# Patient Record
Sex: FEMALE | Race: WHITE | Employment: FULL TIME | URBAN - METROPOLITAN AREA
[De-identification: names, ages, dates, MRNs, and addresses within clinical notes are randomized per-mention and may not be internally consistent; named-entity substitution may affect disease eponyms.]

---

## 2017-02-02 PROCEDURE — 81025 URINE PREGNANCY TEST: CPT | Performed by: INTERNAL MEDICINE

## 2017-04-17 ENCOUNTER — HOSPITAL ENCOUNTER (INPATIENT)
Facility: HOSPITAL | Age: 27
LOS: 3 days | Discharge: HOME | DRG: 417 | End: 2017-04-20
Attending: EMERGENCY MEDICINE | Admitting: INTERNAL MEDICINE
Payer: COMMERCIAL

## 2017-04-17 ENCOUNTER — APPOINTMENT (OUTPATIENT)
Dept: MRI IMAGING | Facility: HOSPITAL | Age: 27
DRG: 417 | End: 2017-04-17
Attending: EMERGENCY MEDICINE
Payer: COMMERCIAL

## 2017-04-17 ENCOUNTER — HOSPITAL ENCOUNTER (OUTPATIENT)
Age: 27
Discharge: LEFT BEFORE DISCHARGE | End: 2017-04-17
Attending: FAMILY MEDICINE
Payer: COMMERCIAL

## 2017-04-17 ENCOUNTER — APPOINTMENT (OUTPATIENT)
Dept: ULTRASOUND IMAGING | Facility: HOSPITAL | Age: 27
DRG: 417 | End: 2017-04-17
Attending: EMERGENCY MEDICINE
Payer: COMMERCIAL

## 2017-04-17 VITALS
SYSTOLIC BLOOD PRESSURE: 123 MMHG | HEART RATE: 71 BPM | OXYGEN SATURATION: 98 % | TEMPERATURE: 99 F | WEIGHT: 150 LBS | RESPIRATION RATE: 18 BRPM | DIASTOLIC BLOOD PRESSURE: 79 MMHG | BODY MASS INDEX: 26 KG/M2

## 2017-04-17 DIAGNOSIS — R10.13 ACUTE EPIGASTRIC PAIN: Primary | ICD-10-CM

## 2017-04-17 DIAGNOSIS — R74.8 ELEVATED LIVER ENZYMES: ICD-10-CM

## 2017-04-17 DIAGNOSIS — K80.20 CALCULUS OF GALLBLADDER WITHOUT CHOLECYSTITIS WITHOUT OBSTRUCTION: ICD-10-CM

## 2017-04-17 DIAGNOSIS — K80.50 CHOLEDOCHOLITHIASIS: Primary | ICD-10-CM

## 2017-04-17 PROCEDURE — 85025 COMPLETE CBC W/AUTO DIFF WBC: CPT | Performed by: EMERGENCY MEDICINE

## 2017-04-17 PROCEDURE — 93005 ELECTROCARDIOGRAM TRACING: CPT

## 2017-04-17 PROCEDURE — 81025 URINE PREGNANCY TEST: CPT

## 2017-04-17 PROCEDURE — 99205 OFFICE O/P NEW HI 60 MIN: CPT

## 2017-04-17 PROCEDURE — 99285 EMERGENCY DEPT VISIT HI MDM: CPT

## 2017-04-17 PROCEDURE — 96376 TX/PRO/DX INJ SAME DRUG ADON: CPT

## 2017-04-17 PROCEDURE — 99215 OFFICE O/P EST HI 40 MIN: CPT

## 2017-04-17 PROCEDURE — 96375 TX/PRO/DX INJ NEW DRUG ADDON: CPT

## 2017-04-17 PROCEDURE — 76700 US EXAM ABDOM COMPLETE: CPT | Performed by: RADIOLOGY

## 2017-04-17 PROCEDURE — 96361 HYDRATE IV INFUSION ADD-ON: CPT

## 2017-04-17 PROCEDURE — 93010 ELECTROCARDIOGRAM REPORT: CPT | Performed by: INTERNAL MEDICINE

## 2017-04-17 PROCEDURE — 80053 COMPREHEN METABOLIC PANEL: CPT | Performed by: EMERGENCY MEDICINE

## 2017-04-17 PROCEDURE — 96374 THER/PROPH/DIAG INJ IV PUSH: CPT

## 2017-04-17 PROCEDURE — 93010 ELECTROCARDIOGRAM REPORT: CPT

## 2017-04-17 PROCEDURE — 81001 URINALYSIS AUTO W/SCOPE: CPT | Performed by: EMERGENCY MEDICINE

## 2017-04-17 PROCEDURE — 74181 MRI ABDOMEN W/O CONTRAST: CPT

## 2017-04-17 PROCEDURE — 83690 ASSAY OF LIPASE: CPT | Performed by: EMERGENCY MEDICINE

## 2017-04-17 RX ORDER — MORPHINE SULFATE 2 MG/ML
2 INJECTION, SOLUTION INTRAMUSCULAR; INTRAVENOUS ONCE
Status: COMPLETED | OUTPATIENT
Start: 2017-04-17 | End: 2017-04-17

## 2017-04-17 RX ORDER — MORPHINE SULFATE 2 MG/ML
2 INJECTION, SOLUTION INTRAMUSCULAR; INTRAVENOUS EVERY 2 HOUR PRN
Status: DISCONTINUED | OUTPATIENT
Start: 2017-04-17 | End: 2017-04-20

## 2017-04-17 RX ORDER — ONDANSETRON 2 MG/ML
4 INJECTION INTRAMUSCULAR; INTRAVENOUS EVERY 4 HOURS PRN
Status: DISCONTINUED | OUTPATIENT
Start: 2017-04-17 | End: 2017-04-17

## 2017-04-17 RX ORDER — HYDROCODONE BITARTRATE AND ACETAMINOPHEN 5; 325 MG/1; MG/1
1 TABLET ORAL EVERY 4 HOURS PRN
Status: DISCONTINUED | OUTPATIENT
Start: 2017-04-17 | End: 2017-04-20

## 2017-04-17 RX ORDER — HEPARIN SODIUM 5000 [USP'U]/ML
5000 INJECTION, SOLUTION INTRAVENOUS; SUBCUTANEOUS EVERY 8 HOURS
Status: DISCONTINUED | OUTPATIENT
Start: 2017-04-17 | End: 2017-04-18 | Stop reason: SDUPTHER

## 2017-04-17 RX ORDER — ALBUTEROL SULFATE 2.5 MG/3ML
2.5 SOLUTION RESPIRATORY (INHALATION) EVERY 6 HOURS PRN
Status: DISCONTINUED | OUTPATIENT
Start: 2017-04-17 | End: 2017-04-18

## 2017-04-17 RX ORDER — HYDROMORPHONE HYDROCHLORIDE 1 MG/ML
0.5 INJECTION, SOLUTION INTRAMUSCULAR; INTRAVENOUS; SUBCUTANEOUS EVERY 30 MIN PRN
Status: ACTIVE | OUTPATIENT
Start: 2017-04-17 | End: 2017-04-18

## 2017-04-17 RX ORDER — MORPHINE SULFATE 2 MG/ML
1 INJECTION, SOLUTION INTRAMUSCULAR; INTRAVENOUS EVERY 2 HOUR PRN
Status: DISCONTINUED | OUTPATIENT
Start: 2017-04-17 | End: 2017-04-20

## 2017-04-17 RX ORDER — SODIUM CHLORIDE 9 MG/ML
1000 INJECTION, SOLUTION INTRAVENOUS ONCE
Status: COMPLETED | OUTPATIENT
Start: 2017-04-17 | End: 2017-04-17

## 2017-04-17 RX ORDER — SODIUM CHLORIDE 9 MG/ML
INJECTION, SOLUTION INTRAVENOUS CONTINUOUS
Status: DISCONTINUED | OUTPATIENT
Start: 2017-04-17 | End: 2017-04-18

## 2017-04-17 RX ORDER — SODIUM CHLORIDE 9 MG/ML
INJECTION, SOLUTION INTRAVENOUS CONTINUOUS
Status: DISCONTINUED | OUTPATIENT
Start: 2017-04-17 | End: 2017-04-20

## 2017-04-17 RX ORDER — ONDANSETRON 2 MG/ML
4 INJECTION INTRAMUSCULAR; INTRAVENOUS ONCE
Status: COMPLETED | OUTPATIENT
Start: 2017-04-17 | End: 2017-04-17

## 2017-04-17 RX ORDER — ONDANSETRON 2 MG/ML
4 INJECTION INTRAMUSCULAR; INTRAVENOUS EVERY 6 HOURS PRN
Status: DISCONTINUED | OUTPATIENT
Start: 2017-04-17 | End: 2017-04-20

## 2017-04-17 RX ORDER — HYDROCODONE BITARTRATE AND ACETAMINOPHEN 5; 325 MG/1; MG/1
2 TABLET ORAL EVERY 4 HOURS PRN
Status: DISCONTINUED | OUTPATIENT
Start: 2017-04-17 | End: 2017-04-20

## 2017-04-17 RX ORDER — ALBUTEROL SULFATE 90 UG/1
2 AEROSOL, METERED RESPIRATORY (INHALATION) EVERY 6 HOURS PRN
Status: DISCONTINUED | OUTPATIENT
Start: 2017-04-17 | End: 2017-04-20

## 2017-04-17 RX ORDER — MORPHINE SULFATE 4 MG/ML
4 INJECTION, SOLUTION INTRAMUSCULAR; INTRAVENOUS EVERY 2 HOUR PRN
Status: DISCONTINUED | OUTPATIENT
Start: 2017-04-17 | End: 2017-04-20

## 2017-04-17 RX ORDER — ACETAMINOPHEN 325 MG/1
650 TABLET ORAL EVERY 4 HOURS PRN
Status: DISCONTINUED | OUTPATIENT
Start: 2017-04-17 | End: 2017-04-20

## 2017-04-17 NOTE — ED INITIAL ASSESSMENT (HPI)
Epigastric pain,describes as constricting feeling under breast that wraps around,  started last noc after eating greek food. Denies n/v/d. Normal BM this AM, no relief w advil PTA.

## 2017-04-17 NOTE — ED PROVIDER NOTES
Patient Seen in: [facility]    History   Patient presents with:  Abdomen/Flank Pain (GI/): epigastric    Stated Complaint: just below sternum pain x 1 day    HPI    Pinedanora Dc is a 32year old female presents with 250 mg by mouth. Cyanocobalamin (VITAMIN B 12 OR),  Take by mouth. albuterol sulfate (2.5 MG/3ML) 0.083% Inhalation Nebu Soln,  Take 3 mL (2.5 mg total) by nebulization every 6 (six) hours as needed for Wheezing or Shortness of Breath.    Mometasone Fur severe acute abdominal pain in the epigastric area. Multiple differential diagnosis considered include acute pancreatitis, acute cholecystitis, bowel obstruction. Offered EMS transfer, patient refused and would like to go by private auto.   Sent to ER for

## 2017-04-17 NOTE — ED NOTES
Pt to ED with c/o generalized abd pain that started at 1900 last night. Pt has increased pain with palpation to all quadrants. Denies N/V/D. Afebrile. Denies dysuria. Pt pale on arrival. Friend at bedside. Updated on poc.

## 2017-04-17 NOTE — ED INITIAL ASSESSMENT (HPI)
Pt c/o epigastric pain since last night, denies nausea or vomiting.  Possible gallstones in the past

## 2017-04-18 ENCOUNTER — ANESTHESIA (OUTPATIENT)
Dept: ENDOSCOPY | Facility: HOSPITAL | Age: 27
DRG: 417 | End: 2017-04-18
Payer: COMMERCIAL

## 2017-04-18 ENCOUNTER — SURGERY (OUTPATIENT)
Age: 27
End: 2017-04-18

## 2017-04-18 ENCOUNTER — ANESTHESIA EVENT (OUTPATIENT)
Dept: ENDOSCOPY | Facility: HOSPITAL | Age: 27
DRG: 417 | End: 2017-04-18
Payer: COMMERCIAL

## 2017-04-18 ENCOUNTER — APPOINTMENT (OUTPATIENT)
Dept: GENERAL RADIOLOGY | Facility: HOSPITAL | Age: 27
DRG: 417 | End: 2017-04-18
Attending: INTERNAL MEDICINE
Payer: COMMERCIAL

## 2017-04-18 PROCEDURE — 94664 DEMO&/EVAL PT USE INHALER: CPT

## 2017-04-18 PROCEDURE — 94640 AIRWAY INHALATION TREATMENT: CPT

## 2017-04-18 PROCEDURE — BF110ZZ FLUOROSCOPY OF BILIARY AND PANCREATIC DUCTS USING HIGH OSMOLAR CONTRAST: ICD-10-PCS | Performed by: INTERNAL MEDICINE

## 2017-04-18 PROCEDURE — 85025 COMPLETE CBC W/AUTO DIFF WBC: CPT | Performed by: INTERNAL MEDICINE

## 2017-04-18 PROCEDURE — 80053 COMPREHEN METABOLIC PANEL: CPT | Performed by: INTERNAL MEDICINE

## 2017-04-18 PROCEDURE — 0F7D8DZ DILATION OF PANCREATIC DUCT WITH INTRALUMINAL DEVICE, VIA NATURAL OR ARTIFICIAL OPENING ENDOSCOPIC: ICD-10-PCS | Performed by: INTERNAL MEDICINE

## 2017-04-18 PROCEDURE — 74330 X-RAY BILE/PANC ENDOSCOPY: CPT

## 2017-04-18 PROCEDURE — 0FC98ZZ EXTIRPATION OF MATTER FROM COMMON BILE DUCT, VIA NATURAL OR ARTIFICIAL OPENING ENDOSCOPIC: ICD-10-PCS | Performed by: INTERNAL MEDICINE

## 2017-04-18 PROCEDURE — 0F798DZ DILATION OF COMMON BILE DUCT WITH INTRALUMINAL DEVICE, VIA NATURAL OR ARTIFICIAL OPENING ENDOSCOPIC: ICD-10-PCS | Performed by: INTERNAL MEDICINE

## 2017-04-18 DEVICE — GEENEN SOF-FLEX PANCREATIC STENT
Type: IMPLANTABLE DEVICE | Status: FUNCTIONAL
Brand: GEENEN SOF-FLEX

## 2017-04-18 RX ORDER — HYDROMORPHONE HYDROCHLORIDE 1 MG/ML
0.4 INJECTION, SOLUTION INTRAMUSCULAR; INTRAVENOUS; SUBCUTANEOUS EVERY 5 MIN PRN
Status: DISCONTINUED | OUTPATIENT
Start: 2017-04-18 | End: 2017-04-18 | Stop reason: HOSPADM

## 2017-04-18 RX ORDER — ONDANSETRON 2 MG/ML
INJECTION INTRAMUSCULAR; INTRAVENOUS
Status: COMPLETED
Start: 2017-04-18 | End: 2017-04-18

## 2017-04-18 RX ORDER — HEPARIN SODIUM 5000 [USP'U]/ML
5000 INJECTION, SOLUTION INTRAVENOUS; SUBCUTANEOUS EVERY 8 HOURS
Status: DISCONTINUED | OUTPATIENT
Start: 2017-04-19 | End: 2017-04-20

## 2017-04-18 RX ORDER — HYDRALAZINE HYDROCHLORIDE 20 MG/ML
5 INJECTION INTRAMUSCULAR; INTRAVENOUS EVERY 10 MIN PRN
Status: DISCONTINUED | OUTPATIENT
Start: 2017-04-18 | End: 2017-04-18 | Stop reason: HOSPADM

## 2017-04-18 RX ORDER — PANTOPRAZOLE SODIUM 20 MG/1
20 TABLET, DELAYED RELEASE ORAL
Status: DISCONTINUED | OUTPATIENT
Start: 2017-04-18 | End: 2017-04-20

## 2017-04-18 RX ORDER — NALOXONE HYDROCHLORIDE 0.4 MG/ML
80 INJECTION, SOLUTION INTRAMUSCULAR; INTRAVENOUS; SUBCUTANEOUS AS NEEDED
Status: DISCONTINUED | OUTPATIENT
Start: 2017-04-18 | End: 2017-04-18 | Stop reason: HOSPADM

## 2017-04-18 RX ORDER — DEXAMETHASONE SODIUM PHOSPHATE 4 MG/ML
4 VIAL (ML) INJECTION AS NEEDED
Status: DISCONTINUED | OUTPATIENT
Start: 2017-04-18 | End: 2017-04-18 | Stop reason: HOSPADM

## 2017-04-18 RX ORDER — ONDANSETRON 2 MG/ML
4 INJECTION INTRAMUSCULAR; INTRAVENOUS AS NEEDED
Status: DISCONTINUED | OUTPATIENT
Start: 2017-04-18 | End: 2017-04-18 | Stop reason: HOSPADM

## 2017-04-18 RX ORDER — LEVOFLOXACIN 5 MG/ML
INJECTION, SOLUTION INTRAVENOUS
Status: DISPENSED
Start: 2017-04-18 | End: 2017-04-19

## 2017-04-18 RX ORDER — SODIUM CHLORIDE, SODIUM LACTATE, POTASSIUM CHLORIDE, CALCIUM CHLORIDE 600; 310; 30; 20 MG/100ML; MG/100ML; MG/100ML; MG/100ML
INJECTION, SOLUTION INTRAVENOUS CONTINUOUS
Status: DISCONTINUED | OUTPATIENT
Start: 2017-04-18 | End: 2017-04-20

## 2017-04-18 RX ORDER — LEVOFLOXACIN 5 MG/ML
INJECTION, SOLUTION INTRAVENOUS
Status: DISCONTINUED | OUTPATIENT
Start: 2017-04-18 | End: 2017-04-20

## 2017-04-18 RX ORDER — ALBUTEROL SULFATE 2.5 MG/3ML
2.5 SOLUTION RESPIRATORY (INHALATION) EVERY 6 HOURS PRN
Status: DISCONTINUED | OUTPATIENT
Start: 2017-04-18 | End: 2017-04-20

## 2017-04-18 RX ORDER — MEPERIDINE HYDROCHLORIDE 25 MG/ML
12.5 INJECTION INTRAMUSCULAR; INTRAVENOUS; SUBCUTANEOUS AS NEEDED
Status: DISCONTINUED | OUTPATIENT
Start: 2017-04-18 | End: 2017-04-18 | Stop reason: HOSPADM

## 2017-04-18 RX ORDER — METOCLOPRAMIDE HYDROCHLORIDE 5 MG/ML
10 INJECTION INTRAMUSCULAR; INTRAVENOUS AS NEEDED
Status: DISCONTINUED | OUTPATIENT
Start: 2017-04-18 | End: 2017-04-18 | Stop reason: HOSPADM

## 2017-04-18 RX ORDER — LEVOFLOXACIN 5 MG/ML
500 INJECTION, SOLUTION INTRAVENOUS EVERY 24 HOURS
Status: DISCONTINUED | OUTPATIENT
Start: 2017-04-18 | End: 2017-04-20

## 2017-04-18 NOTE — H&P
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 Disp: 90 capsule Rfl: 2   magnesium 250 MG Oral Tab Take 250 mg by mouth. Disp:  Rfl:    Cyanocobalamin (VITAMIN B 12 OR) Take by mouth.  Disp:  Rfl:    albuterol sulfate (2.5 MG 04/18/2017   HCT 31.4 04/18/2017   .0 04/18/2017   CREATSERUM 0.58 04/18/2017   BUN 9 04/18/2017    04/18/2017   K 4.0 04/18/2017    04/18/2017   CO2 24.0 04/18/2017   GLU 92 04/18/2017   CA 8.4 04/18/2017   ALB 3.3 04/18/2017   ALKPHO 1

## 2017-04-18 NOTE — ED NOTES
Pt updated, given warm blanket. Pt's pain scale updated, pt had to think hard and palpate her abd to figure out her pain score of 5. Pt appears much more comfortable,  at bedside.

## 2017-04-18 NOTE — ANESTHESIA POSTPROCEDURE EVALUATION
[address] Patient Status:  Inpatient   Age/Gender 32year old female MRN [id number]   Location [facility]. Attending Brunilda Espinoza Day # 1 PCP Queen Carson MD       Anesthesia Post-op Note    Pr

## 2017-04-18 NOTE — PAYOR COMM NOTE
Attending Physician: Pete Kimball,*    Review Type: ADMISSION   Reviewer: Macy Rowe       Date: April 18, 2017 - 1:07 PM  Payor: HEMA JONES  Authorization Number: [id number]  Admit date: 4/17/2017  6:32 PM   Admitted from Emergency Dept.: yes pain.    Transaminates/Pancreatitis  - Abd US/MRCP show + gallstones, CBD dilation and strictures of the distal CBD w/o evidence of stone  - Will need ERCP/EUS  - GI to see today  - No clinical or laboratory evidence of cholangitis, OK to hold on Abx    - 2302 New Bag (none) Intravenous Rubina Mata RN      0.9%  NaCl infusion     Date Action Dose Route User    4/17/2017 2100 New Bag 1000 mL Intravenous (Right Antecubital) Yusra Allison RN      sodium chloride 0.9% IV bolus 1,000 mL     Date Action Narrative: The following orders were created for panel order CBC WITH DIFFERENTIAL WITH PLATELET.   Procedure                               Abnormality         Status                     ---------                               -----------         ------

## 2017-04-18 NOTE — ED NOTES
Pt signed out [place] after discussing plan of care w Dr Desiree Mckeon. Advised to have  drive her directly to [address] [facility] [address].

## 2017-04-18 NOTE — ANESTHESIA PREPROCEDURE EVALUATION
PRE-OP EVALUATION    Patient Name: Pete Mg    Pre-op Diagnosis: CHOLEDOCHOLITHIASIS    Procedure(s):  ENDOSCOPIC ULTRASOUND POSSIBLE CHOLANGIOPANCREATOGRAPHY.     Surgeon(s) and Role:     * Anthony Barajas MD - Primary    Pre-op vitals review [] HYDROmorphone HCl PF (DILAUDID) 1 MG/ML injection 0.5 mg 0.5 mg Intravenous Q30 Min PRN   [DISCONTINUED] ondansetron HCl (ZOFRAN) injection 4 mg 4 mg Intravenous Q4H PRN   [COMPLETED] 0.9%  NaCl infusion 1,000 mL Intravenous Once   [DISCONTINUE birth    Comment trach    OTHER SURGICAL HISTORY  birth    Comment ex lap, repositioning of organs, shunts    OTHER SURGICAL HISTORY      Comment left artificial diaphragm    REMOVAL OF LUNG,LOBECTOMY          Smoking status: Never Smoker     Smokeless tob

## 2017-04-18 NOTE — PROGRESS NOTES
NURSING ADMISSION NOTE      Patient admitted via cart  Oriented to room. Safety precautions initiated. Bed in low position. Call light in reach. States pain 3 on pain scale. Denies nausea. Review of plan of care and md orders.  Pt verb understanding

## 2017-04-18 NOTE — OPERATIVE REPORT
[facility] REPORT   PATIENT NAME: Daisy Andino  MRN: [id number]  DATE OF OPERATION: 4/18/2017  PREOPERATIVE DIAGNOSIS: elevated liver enzymes, dilated CBD, epigastric abdominal pain, cholelithiasis, distal CBD stricture  POSTOPERATIVE D seen and measured 1.9  mm in the neck and was seen going toward the head of pancreas. The pancreatic tail was seen next to the left kidney and spleen. The scope was advanced into the duodenal bulb and the pancreatic head was interrogated next.   The pancr balloon was used to performed occlusion cholangiogram.  Excellent biliary drainage was noted with the sweep and sludge was removed. The balloon fully inflated at 1 cm came through the papilla with some resistance and torquing of the scope.   Good but slow

## 2017-04-18 NOTE — PROGRESS NOTES
Consult received this afternoon, patient in recovery following EUS. Will see patient tomorrow morning. Tentatively added for laparoscopic cholecystectomy Thursday for Dr Jamaal Cueto.

## 2017-04-18 NOTE — BRIEF OP NOTE
Pre-Operative Diagnosis: CHOLEDOCHOLITHIASIS     Post-Operative Diagnosis: CBD sludge     Procedure Performed:   Procedure(s):  ENDOSCOPIC ULTRASOUND POSSIBLE CHOLANGIOPANCREATOGRAPHY.   ERCP with sphincterotomy, balloon sweep, pancreatic stent placement

## 2017-04-18 NOTE — CONSULTS
[facility]    Report of Gastroenterology Consultation    Mei Gomez Patient Status:  Inpatient    1990 MRN [id number]   [facility] ENDOSCOPY Attending Velasquez Hardy Day # 1 PCP Trisha Moser MD     Da drugs.     Allergies:    Pcn [Penicillins]       Swelling    Medications:    Current facility-administered medications:   •  albuterol sulfate (VENTOLIN) (2.5 MG/3ML) 0.083% nebulizer solution 2.5 mg, 2.5 mg, Nebulization, Q6H PRN  •  Pantoprazole Sodium (P Denies history of heart murmur, leg swelling, history of rheumatic fever, pacemaker, chest pain or pressure after eating or when upset, automatic defibrillator, angina, other implanted devices, irregular heart rate/palpitations, high cholesterol or triglyc No murmurs or gallops. Lungs: Clear to auscultation. Abdomen: Soft and nondistended. Nontender. No masses. Bowel sounds are present. Back: No CVA tenderness. Extremities: No edema, cyanosis, or clubbing. Skin: Warm and dry.   Rectal: Normal perianal

## 2017-04-18 NOTE — PLAN OF CARE
Maintains adequate nutritional intake (undernourished) Not Progressing      Discharge to home or other facility with appropriate resources Progressing      Minimal or absence of nausea and vomiting Progressing      Maintains or returns to baseline bowel fu

## 2017-04-18 NOTE — ED PROVIDER NOTES
Patient Seen in: [facility] Emergency Department    History   Patient presents with:  Abdomen/Flank Pain (GI/)    Stated Complaint: abdominal pain    HPI    40-year-old female presents emergency room with chief complaint of epigastric/right upper Mometasone Furo-Formoterol Fum (DULERA) 200-5 MCG/ACT Inhalation Aerosol,  Inhale 2 puffs into the lungs 2 (two) times daily.        Family History   Problem Relation Age of Onset   • Lipids Mother    • Hypertension Mother    • Heart Disorder Mother    • Jorge Keller dena.           ED Course     Labs Reviewed   COMP METABOLIC PANEL (14) - Abnormal; Notable for the following:     Alkaline Phosphatase 136 (*)      (*)     Alt 149 (*)     All other components within normal limits   LIPASE - Abnormal; Notable for treatment      Disposition and Plan     Clinical Impression:  Choledocholithiasis  (primary encounter diagnosis)  Elevated liver enzymes  Calculus of gallbladder without cholecystitis without obstruction    Disposition:  Admit    Follow-up:  No follow-up p

## 2017-04-19 ENCOUNTER — ANESTHESIA EVENT (OUTPATIENT)
Dept: SURGERY | Facility: HOSPITAL | Age: 27
End: 2017-04-19

## 2017-04-19 RX ORDER — METRONIDAZOLE 500 MG/100ML
500 INJECTION, SOLUTION INTRAVENOUS EVERY 8 HOURS
Status: DISCONTINUED | OUTPATIENT
Start: 2017-04-19 | End: 2017-04-20

## 2017-04-19 NOTE — PROGRESS NOTES
[facility] Hospitalist Progress Note                                                                   David 88 D Anselment  1/17/1990    CC:  Fu Abd Pain    Interval History:  - S/P ERCP/EUS found to Recent Labs   Lab  04/17/17   1839  04/18/17   0620   RBC  4.32  3.80   HGB  11.3*  10.1*   HCT  35.1  31.4*   MCV  81.3  82.6   MCH  26.2*  26.6*   MCHC  32.2  32.2   RDW  14.9  15.1   NEPRELIM  5.96  2.55   WBC  9.9  5.0   PLT  289.0  208.0

## 2017-04-19 NOTE — PROGRESS NOTES
VSS. Afebrile. Alert and orientated. Morphine effective for pain control. Reports pain in throat. LS clear on right side, absent on left side; pulse ox maintained per protocol.  SCD on. BS hypoactive, denies passing flatus, tolerating clear liquid diet, den

## 2017-04-19 NOTE — CONSULTS
[facility]  Report of Consultation    Fina Yanez Patient Status:  Inpatient    1990 MRN [id number]   [facility] 3NW-A Attending Dennys Mosher,*   Hosp Day # 2 PCP Shefali Tobar MD     Reason for Consultation: Social History:     reports that she has never smoked. She does not have any smokeless tobacco history on file. She reports that she does not drink alcohol or use illicit drugs.     Allergies:      Pcn [Penicillins]       Swelling    Current Medicatio omeprazole 20 MG Oral Capsule Delayed Release Take 1 capsule (20 mg total) by mouth once daily. Disp: 90 capsule Rfl: 2   magnesium 250 MG Oral Tab Take 250 mg by mouth. Disp:  Rfl:    Cyanocobalamin (VITAMIN B 12 OR) Take by mouth.  Disp:  Rfl:    albute ultrasound was used to evaluate the abdomen. The exam includes images of the liver, gallbladder, common bile duct, pancreas, spleen, kidneys, IVC, and aorta. FINDINGS:  LIVER:  Normal size and echogenicity. No significant masses.  BILIARY:  There multiple TECHNIQUE:    Multiplanar single shot fast spin echo, chemical shift imaging, breath hold T2 weighted images and 2-D fiesta sequences were acquired. No contrast material was administered. Fluid sensitive imaging with MRCP.  reconstruction was also performed choledocholithiasis  -US as noted  -EUS/ERCP as noted      Plan: Will plan with surgical management, laparoscopic cholecystectomy, possible open. patient scheduled for Thursday 4-20-17  -NPO  -IV fluids  -IV antibiotics  -discussed recommendations with ramesh

## 2017-04-20 ENCOUNTER — SURGERY (OUTPATIENT)
Age: 27
End: 2017-04-20

## 2017-04-20 ENCOUNTER — ANESTHESIA (OUTPATIENT)
Dept: SURGERY | Facility: HOSPITAL | Age: 27
End: 2017-04-20

## 2017-04-20 ENCOUNTER — APPOINTMENT (OUTPATIENT)
Dept: GENERAL RADIOLOGY | Facility: HOSPITAL | Age: 27
DRG: 417 | End: 2017-04-20
Attending: SURGERY
Payer: COMMERCIAL

## 2017-04-20 VITALS
SYSTOLIC BLOOD PRESSURE: 114 MMHG | OXYGEN SATURATION: 100 % | HEART RATE: 96 BPM | WEIGHT: 150 LBS | DIASTOLIC BLOOD PRESSURE: 75 MMHG | HEIGHT: 64 IN | TEMPERATURE: 98 F | RESPIRATION RATE: 18 BRPM | BODY MASS INDEX: 25.61 KG/M2

## 2017-04-20 PROCEDURE — BF100ZZ FLUOROSCOPY OF BILE DUCTS USING HIGH OSMOLAR CONTRAST: ICD-10-PCS | Performed by: SURGERY

## 2017-04-20 PROCEDURE — 0FT44ZZ RESECTION OF GALLBLADDER, PERCUTANEOUS ENDOSCOPIC APPROACH: ICD-10-PCS | Performed by: SURGERY

## 2017-04-20 PROCEDURE — 76000 FLUOROSCOPY <1 HR PHYS/QHP: CPT

## 2017-04-20 PROCEDURE — 85025 COMPLETE CBC W/AUTO DIFF WBC: CPT | Performed by: HOSPITALIST

## 2017-04-20 PROCEDURE — 80053 COMPREHEN METABOLIC PANEL: CPT | Performed by: HOSPITALIST

## 2017-04-20 PROCEDURE — 88304 TISSUE EXAM BY PATHOLOGIST: CPT | Performed by: SURGERY

## 2017-04-20 RX ORDER — ONDANSETRON 2 MG/ML
4 INJECTION INTRAMUSCULAR; INTRAVENOUS AS NEEDED
Status: ACTIVE | OUTPATIENT
Start: 2017-04-20 | End: 2017-04-20

## 2017-04-20 RX ORDER — HYDROCODONE BITARTRATE AND ACETAMINOPHEN 5; 325 MG/1; MG/1
1 TABLET ORAL EVERY 4 HOURS PRN
Qty: 30 TABLET | Refills: 0 | Status: SHIPPED | OUTPATIENT
Start: 2017-04-20 | End: 2017-04-30

## 2017-04-20 RX ORDER — HYDROCODONE BITARTRATE AND ACETAMINOPHEN 5; 325 MG/1; MG/1
2 TABLET ORAL EVERY 4 HOURS PRN
Status: DISCONTINUED | OUTPATIENT
Start: 2017-04-20 | End: 2017-04-20

## 2017-04-20 RX ORDER — NALOXONE HYDROCHLORIDE 0.4 MG/ML
80 INJECTION, SOLUTION INTRAMUSCULAR; INTRAVENOUS; SUBCUTANEOUS AS NEEDED
Status: ACTIVE | OUTPATIENT
Start: 2017-04-20 | End: 2017-04-20

## 2017-04-20 RX ORDER — BUPIVACAINE HYDROCHLORIDE 5 MG/ML
INJECTION, SOLUTION EPIDURAL; INTRACAUDAL AS NEEDED
Status: DISCONTINUED | OUTPATIENT
Start: 2017-04-20 | End: 2017-04-20

## 2017-04-20 RX ORDER — SODIUM CHLORIDE, SODIUM LACTATE, POTASSIUM CHLORIDE, CALCIUM CHLORIDE 600; 310; 30; 20 MG/100ML; MG/100ML; MG/100ML; MG/100ML
INJECTION, SOLUTION INTRAVENOUS CONTINUOUS
Status: DISCONTINUED | OUTPATIENT
Start: 2017-04-20 | End: 2017-04-20

## 2017-04-20 RX ORDER — MEPERIDINE HYDROCHLORIDE 25 MG/ML
12.5 INJECTION INTRAMUSCULAR; INTRAVENOUS; SUBCUTANEOUS AS NEEDED
Status: DISCONTINUED | OUTPATIENT
Start: 2017-04-20 | End: 2017-04-20

## 2017-04-20 RX ORDER — ONDANSETRON 2 MG/ML
4 INJECTION INTRAMUSCULAR; INTRAVENOUS EVERY 6 HOURS PRN
Status: DISCONTINUED | OUTPATIENT
Start: 2017-04-20 | End: 2017-04-20

## 2017-04-20 RX ORDER — HYDROCODONE BITARTRATE AND ACETAMINOPHEN 5; 325 MG/1; MG/1
2 TABLET ORAL AS NEEDED
Status: DISCONTINUED | OUTPATIENT
Start: 2017-04-20 | End: 2017-04-20

## 2017-04-20 RX ORDER — MEPERIDINE HYDROCHLORIDE 25 MG/ML
INJECTION INTRAMUSCULAR; INTRAVENOUS; SUBCUTANEOUS
Status: COMPLETED
Start: 2017-04-20 | End: 2017-04-20

## 2017-04-20 RX ORDER — SODIUM CHLORIDE 9 MG/ML
INJECTION, SOLUTION INTRAVENOUS CONTINUOUS
Status: DISCONTINUED | OUTPATIENT
Start: 2017-04-20 | End: 2017-04-20

## 2017-04-20 RX ORDER — MIDAZOLAM HYDROCHLORIDE 1 MG/ML
1 INJECTION INTRAMUSCULAR; INTRAVENOUS EVERY 5 MIN PRN
Status: ACTIVE | OUTPATIENT
Start: 2017-04-20 | End: 2017-04-20

## 2017-04-20 RX ORDER — HYDROMORPHONE HYDROCHLORIDE 1 MG/ML
0.4 INJECTION, SOLUTION INTRAMUSCULAR; INTRAVENOUS; SUBCUTANEOUS EVERY 5 MIN PRN
Status: ACTIVE | OUTPATIENT
Start: 2017-04-20 | End: 2017-04-20

## 2017-04-20 RX ORDER — MORPHINE SULFATE 2 MG/ML
2 INJECTION, SOLUTION INTRAMUSCULAR; INTRAVENOUS EVERY 2 HOUR PRN
Status: DISCONTINUED | OUTPATIENT
Start: 2017-04-20 | End: 2017-04-20

## 2017-04-20 RX ORDER — HYDROCODONE BITARTRATE AND ACETAMINOPHEN 5; 325 MG/1; MG/1
1 TABLET ORAL EVERY 4 HOURS PRN
Status: DISCONTINUED | OUTPATIENT
Start: 2017-04-20 | End: 2017-04-20

## 2017-04-20 RX ORDER — HYDROCODONE BITARTRATE AND ACETAMINOPHEN 5; 325 MG/1; MG/1
1 TABLET ORAL AS NEEDED
Status: DISCONTINUED | OUTPATIENT
Start: 2017-04-20 | End: 2017-04-20

## 2017-04-20 RX ORDER — HYDROMORPHONE HYDROCHLORIDE 1 MG/ML
INJECTION, SOLUTION INTRAMUSCULAR; INTRAVENOUS; SUBCUTANEOUS
Status: COMPLETED
Start: 2017-04-20 | End: 2017-04-20

## 2017-04-20 NOTE — PROGRESS NOTES
VSS. Afebrile. Alert and orientated. Tylenol effective for pain control. Denies need for PRN morphine. NPO since 0000 per orders, tolerating well. BS active, passing flatus, denies N/V, no BM. LS clear, denies SOB/CP, stable on room air. SCD on.  Voiding in

## 2017-04-20 NOTE — PROGRESS NOTES
[facility] Hospitalist Progress Note                                                                   [facility] D Anselment  1/17/1990    CC:  Fu Abd Pain    Interval History:  - S/P lap richard today- 110  110   CO2  26.0  24.0  23.0   ALKPHO  136*  132*  98   AST  298*  370*  50*   ALT  149*  289*  122*   BILT  1.1  2.0  0.5   TP  7.8  6.2  5.6*       Recent Labs   Lab  04/17/17   1839  04/18/17   0620  04/20/17   0518   RBC  4.32  3.80  3.52*   HGB  1

## 2017-04-20 NOTE — PROGRESS NOTES
NO RESPIRATORY DIFFICULTY NOTED, ABD SOFT, DENIES NAUSEA, C/O SOME TENDERNESS BUT DENIES ANY PAIN, UP IN ROOM WITH STEADY GAIT, PT TEARFUL AND ANXIOUS ABOUT SURGERY, STATES FAMILY UNABLE TO COME TODAY, RECEIVED CALL FROM SURGERY AT 0830 THAT HER SURGERY WI

## 2017-04-20 NOTE — ANESTHESIA POSTPROCEDURE EVALUATION
[address] Patient Status:  Inpatient   Age/Gender 32year old female MRN [id number]   [facility] SURGERY Attending Sonya Espinoza Good Day # 3 PCP Aide Caro MD       Anesthesia Post-op Note    Proc

## 2017-04-20 NOTE — BRIEF OP NOTE
Pre-Operative Diagnosis: cholecystitis with cholelithiasis     Post-Operative Diagnosis: same     Procedure Performed:   LAPAROSCOPIC CHOLECYSTECTOMY WITH MURIEL; IOC    Surgeon(s) and Role:     Rhonda Mcdonald MD - Primary    Assistant(s):  PA: Scot Youssef,

## 2017-04-20 NOTE — OPERATIVE REPORT
[facility]    PATIENT'S NAME: ANGEL PURCELL   ATTENDING PHYSICIAN: Harriet Espinoza MD   OPERATING PHYSICIAN: Susanne Waters M.D.    PATIENT ACCOUNT#:   [de-identified]    LOCATION:  [facility] OR [place] ROOMS 12 ED [address] #:   [id number] stitches on the fascia to anchor the Mike trocar. Then the abdomen was insufflated and under direct visualization, an 11 port was placed in the subxiphoid.   There were some adhesions that covered that were released, and then subsequently 2 additional po operating room, and transferred to PACU in stable condition. At the end the case, needle, instrument, and sponge counts were all correct.       Dictated By Christina Calhoun M.D.  d: 04/20/2017 10:44:55  t: 04/20/2017 12:12:31  [facility] [id number]  NT/

## 2017-04-20 NOTE — ANESTHESIA PREPROCEDURE EVALUATION
PRE-OP EVALUATION    Patient Name: Nkechi Mg    Pre-op Diagnosis: INPT      Procedure(s):  LAPAROSCOPIC CHOLECYSTECTOMY     Surgeon(s) and Role:     Ruma Mulligan MD - Primary    Pre-op vitals reviewed.   Temp: 98.2 °F (36.8 °C)  Pulse: 60  Res 100 mg 100 mg Rectal Once   [COMPLETED] ondansetron HCl (ZOFRAN) 4 MG/2ML injection      Heparin Sodium (Porcine) 5000 UNIT/ML injection 5,000 Units 5,000 Units Subcutaneous Q8H   [COMPLETED] morphINE sulfate (PF) 2 MG/ML injection 2 mg 2 mg Intravenous On Pcn      Anesthesia Evaluation    Patient summary reviewed. Anesthetic Complications  (-) history of anesthetic complications         GI/Hepatic/Renal  Comment: Choledocholithiasis  Negative GI/hepatic/renal ROSALINDA.                              Davey Fabry normal.         Dental    No notable dental history.          Pulmonary    Pulmonary exam normal.                 Other findings            ASA: 3   Plan: general             Plan/risks discussed with: patient                Present on Admission:   **None**

## 2017-04-21 NOTE — PAYOR COMM NOTE
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     Transaminates/Pancreatitis  - Abd US/MRCP show + gallstones, CBD dilation and (DULERA) 200-5 MCG/ACT Inhalation Aerosol  Inhale 2 puffs into the lungs 2 (two) times daily. , Script not printed, Disp-1 Inhaler, R-5          Activity: activity as tolerated  Diet: regular diet  Wound Care: as directed  Code Status: Full Code        Exam

## 2017-04-21 NOTE — PROGRESS NOTES
NO RESPIRATORY DIFFICULTY NOTED, O2 SATS MAINTAIN >90 ON ROOM AIR, ABD SOFT, TAKES LIQUIDS THIS AFTEROON AND SOFT FOODS THIS EVENING AND SCOT WELL, DENIES ANY NAUSEA, LAP SITES REMAIN CLEAN AND DRY, VOIDING IN GOOD AMOUNTS AND DENIES ANY URINARY DIFFICULTY,

## 2017-04-21 NOTE — PROGRESS NOTES
SPOKE WITH DR Lena Peacock REGARDING DISCHARGE THIS EVENING AND STATES PT NEEDS KUB FOR PANCREATIC STENT FOLLOW UP AND NEEDS TO CALL DR Hermilo Soler AFTER X-RAY DONE

## 2017-04-21 NOTE — PROGRESS NOTES
DISCHARGED TO HOME PER WHEELCHAIR AND ACCOMPANIED BY FAMILY, DISCHARGED WITH INSTRUCTIONS, RX AND INFO FOR LISA, ALSO VERBALZIES UNDERSTANDING THAT NEEDS TO HAVE X-RAY DONE IN 2 WEEKS AND INSTRUCTED ON DC INSTRUCTIONS WHERE ORDER FOR X-RAY IS AND HOW TO S

## 2017-04-21 NOTE — PROGRESS NOTES
DISCHARGE INSTRUCTIONS GIVEN AND PT GIVEN RX AND INFORMATION FOR Mat Jamilah UNDERSTANDING OF ALL INSTRUCTIONS

## 2017-04-21 NOTE — PROGRESS NOTES
RECEIVED FROM PACU PER BED, PT AWAKE AND ORIENTED, NO RESPIRATORY DIFFICULTY NOTED, O2 SATS >90 ON ROOM AIR, ABD SOFT, DENIES NAUSEA, LAP SITES X4 NOTED WITH NO DRNG , DENIES BLADDER FULLNESS AT THIS TIME, STATES PAIN TOLERABLE AND RESTS QUIETLY

## 2017-04-21 NOTE — DISCHARGE SUMMARY
General Medicine Discharge Summary     Patient ID:  Mei Gomez  32year old  1/17/1990    Admit date: 4/17/2017    Discharge date and time:  4/20/17    Attending Physician: Cookie att. radha sanches of Asthma/Emphysema with congential loss of left lobe of the lung  - Respiratory status stable, no recent flairs of her asthma requiring hospitalization in 10+ years  - Continue breo and prn nebs    GERD  - Continue PPI    Anemia  - Mild, may be dilutional wheezing  Abdomen: Incisions c/d/i  Extremities: no pedal edema   Neuro: CN inact, no focal deficits      Total time coordinating care for discharge:>30 minutes    MD Cedrick Lane MD  [facility] Hospitalist  Pager: [phone]

## 2017-12-31 ENCOUNTER — APPOINTMENT (OUTPATIENT)
Dept: GENERAL RADIOLOGY | Facility: HOSPITAL | Age: 27
End: 2017-12-31
Attending: EMERGENCY MEDICINE
Payer: COMMERCIAL

## 2017-12-31 ENCOUNTER — HOSPITAL ENCOUNTER (EMERGENCY)
Facility: HOSPITAL | Age: 27
Discharge: HOME | End: 2017-12-31
Attending: EMERGENCY MEDICINE
Payer: COMMERCIAL

## 2017-12-31 VITALS
SYSTOLIC BLOOD PRESSURE: 125 MMHG | HEIGHT: 65 IN | WEIGHT: 145 LBS | HEART RATE: 76 BPM | BODY MASS INDEX: 24.16 KG/M2 | DIASTOLIC BLOOD PRESSURE: 73 MMHG | TEMPERATURE: 99 F | OXYGEN SATURATION: 97 % | RESPIRATION RATE: 25 BRPM

## 2017-12-31 DIAGNOSIS — J45.41 MODERATE PERSISTENT ASTHMA WITH EXACERBATION: Primary | ICD-10-CM

## 2017-12-31 LAB
ADENOVIRUS PCR:: NEGATIVE
ALBUMIN SERPL-MCNC: 4.1 G/DL (ref 3.5–4.8)
ALP LIVER SERPL-CCNC: 69 U/L (ref 37–98)
ALT SERPL-CCNC: 20 U/L (ref 14–54)
AST SERPL-CCNC: 12 U/L (ref 15–41)
B PERT DNA SPEC QL NAA+PROBE: NEGATIVE
BASOPHILS # BLD AUTO: 0.01 X10(3) UL (ref 0–0.1)
BASOPHILS NFR BLD AUTO: 0.2 %
BILIRUB SERPL-MCNC: 0.3 MG/DL (ref 0.1–2)
BUN BLD-MCNC: 8 MG/DL (ref 8–20)
C PNEUM DNA SPEC QL NAA+PROBE: NEGATIVE
CALCIUM BLD-MCNC: 9.1 MG/DL (ref 8.3–10.3)
CHLORIDE: 106 MMOL/L (ref 101–111)
CO2: 26 MMOL/L (ref 22–32)
CORONAVIRUS 229E PCR:: NEGATIVE
CORONAVIRUS HKU1 PCR:: NEGATIVE
CORONAVIRUS NL63 PCR:: NEGATIVE
CORONAVIRUS OC43 PCR:: NEGATIVE
CREAT BLD-MCNC: 0.88 MG/DL (ref 0.55–1.02)
D-DIMER: <0.27 UG/ML FEU (ref 0–0.49)
EOSINOPHIL # BLD AUTO: 0 X10(3) UL (ref 0–0.3)
EOSINOPHIL NFR BLD AUTO: 0 %
ERYTHROCYTE [DISTWIDTH] IN BLOOD BY AUTOMATED COUNT: 13.9 % (ref 11.5–16)
FLUAV RNA SPEC QL NAA+PROBE: NEGATIVE
FLUBV RNA SPEC QL NAA+PROBE: NEGATIVE
GLUCOSE BLD-MCNC: 126 MG/DL (ref 70–99)
HCT VFR BLD AUTO: 36.6 % (ref 34–50)
HGB BLD-MCNC: 11.8 G/DL (ref 12–16)
IMMATURE GRANULOCYTE COUNT: 0.02 X10(3) UL (ref 0–1)
IMMATURE GRANULOCYTE RATIO %: 0.4 %
LYMPHOCYTES # BLD AUTO: 0.63 X10(3) UL (ref 0.9–4)
LYMPHOCYTES NFR BLD AUTO: 13 %
M PROTEIN MFR SERPL ELPH: 7.9 G/DL (ref 6.1–8.3)
MCH RBC QN AUTO: 27.5 PG (ref 27–33.2)
MCHC RBC AUTO-ENTMCNC: 32.2 G/DL (ref 31–37)
MCV RBC AUTO: 85.3 FL (ref 81–100)
METAPNEUMOVIRUS PCR:: NEGATIVE
MONOCYTES # BLD AUTO: 0.24 X10(3) UL (ref 0.1–0.6)
MONOCYTES NFR BLD AUTO: 5 %
MYCOPLASMA PNEUMONIA PCR:: NEGATIVE
NEUTROPHIL ABS PRELIM: 3.94 X10 (3) UL (ref 1.3–6.7)
NEUTROPHILS # BLD AUTO: 3.94 X10(3) UL (ref 1.3–6.7)
NEUTROPHILS NFR BLD AUTO: 81.4 %
PARAINFLUENZA 1 PCR:: NEGATIVE
PARAINFLUENZA 2 PCR:: NEGATIVE
PARAINFLUENZA 3 PCR:: NEGATIVE
PARAINFLUENZA 4 PCR:: NEGATIVE
PLATELET # BLD AUTO: 221 10(3)UL (ref 150–450)
POTASSIUM SERPL-SCNC: 3.8 MMOL/L (ref 3.6–5.1)
RBC # BLD AUTO: 4.29 X10(6)UL (ref 3.8–5.1)
RED CELL DISTRIBUTION WIDTH-SD: 43 FL (ref 35.1–46.3)
RHINOVIRUS/ENTERO PCR:: NEGATIVE
RSV RNA SPEC QL NAA+PROBE: POSITIVE
SODIUM SERPL-SCNC: 140 MMOL/L (ref 136–144)
WBC # BLD AUTO: 4.8 X10(3) UL (ref 4–13)

## 2017-12-31 PROCEDURE — 36415 COLL VENOUS BLD VENIPUNCTURE: CPT

## 2017-12-31 PROCEDURE — 94644 CONT INHLJ TX 1ST HOUR: CPT

## 2017-12-31 PROCEDURE — 85025 COMPLETE CBC W/AUTO DIFF WBC: CPT | Performed by: EMERGENCY MEDICINE

## 2017-12-31 PROCEDURE — 87581 M.PNEUMON DNA AMP PROBE: CPT | Performed by: EMERGENCY MEDICINE

## 2017-12-31 PROCEDURE — 87798 DETECT AGENT NOS DNA AMP: CPT | Performed by: EMERGENCY MEDICINE

## 2017-12-31 PROCEDURE — 81025 URINE PREGNANCY TEST: CPT

## 2017-12-31 PROCEDURE — 87486 CHLMYD PNEUM DNA AMP PROBE: CPT | Performed by: EMERGENCY MEDICINE

## 2017-12-31 PROCEDURE — 99285 EMERGENCY DEPT VISIT HI MDM: CPT

## 2017-12-31 PROCEDURE — 93010 ELECTROCARDIOGRAM REPORT: CPT

## 2017-12-31 PROCEDURE — 87999 UNLISTED MICROBIOLOGY PX: CPT

## 2017-12-31 PROCEDURE — 87633 RESP VIRUS 12-25 TARGETS: CPT | Performed by: EMERGENCY MEDICINE

## 2017-12-31 PROCEDURE — 85378 FIBRIN DEGRADE SEMIQUANT: CPT | Performed by: EMERGENCY MEDICINE

## 2017-12-31 PROCEDURE — 93005 ELECTROCARDIOGRAM TRACING: CPT

## 2017-12-31 PROCEDURE — 71020 XR CHEST PA + LAT CHEST (CPT=71020): CPT | Performed by: EMERGENCY MEDICINE

## 2017-12-31 PROCEDURE — 80053 COMPREHEN METABOLIC PANEL: CPT | Performed by: EMERGENCY MEDICINE

## 2017-12-31 RX ORDER — BENZONATATE 100 MG/1
100 CAPSULE ORAL 3 TIMES DAILY PRN
Qty: 30 CAPSULE | Refills: 0 | Status: SHIPPED | OUTPATIENT
Start: 2017-12-31 | End: 2018-01-30

## 2017-12-31 RX ORDER — PREDNISONE 10 MG/1
TABLET ORAL
Qty: 18 TABLET | Refills: 0 | Status: SHIPPED | OUTPATIENT
Start: 2017-12-31 | End: 2018-03-05

## 2017-12-31 NOTE — ED PROVIDER NOTES
Patient Seen in: [facility] Emergency Department    History   Patient presents with:  Dyspnea MARVIN SOB (respiratory)    Stated Complaint: marvin, 1 lung    HPI    Patient is a 71-year-old with a history of congenital absence of her left lung, dextrocard LUNG,LOBECTOMY        Smoking status: Never Smoker                                                              Smokeless tobacco: Never Used                      Alcohol use:  No               Comment: rare      Review of Systems    Positive for stated com predictive value of  approximately 95% when results are used as part of the total clinical  evaluation of the patient.     1st Trimester:  0.05-0.95 ug/mL (FEU)     2nd Trimester:  0.32-1.29 ug/mL (FEU)    3rd Trimester:  0.13-1.70 ug/mL (FEU)    In pregnan taking steroids and antibiotics as an outpatient. She recently restarted steroids. She is not hypoxic. No fever. Chest x-ray is clear. D-dimer is negative I doubt PE. I spoke with Dr. Javon Deluca on call for her pulmonology group.   He recommends a longer

## 2017-12-31 NOTE — ED INITIAL ASSESSMENT (HPI)
Patient presents with YAMILA x 1 month. She has been treated with prednisone and is now on her second burst of prednisone per her pulmonologist with no improvement. Today she had a severe coughing attack where she reports she had blood in her nose and mouth.

## 2018-01-01 NOTE — ED NOTES
Susan from lab called, patient is positive for RSV from flu swab. Dr Good notified.  To let patient know, she will be following up with her Pulmonary MD.

## 2018-01-01 NOTE — ED NOTES
Called patient to let her know of + RSV result, message left to return call to charge RN# at [facility] at [phone].

## 2018-01-01 NOTE — ED NOTES
Pt returned call, notified of + RSV result, aware to follow up with her pulmonary MD and to return to ED with changes or concerns.

## 2018-01-03 LAB
ATRIAL RATE: 73 BPM
P AXIS: 64 DEGREES
P-R INTERVAL: 114 MS
Q-T INTERVAL: 390 MS
QRS DURATION: 86 MS
QTC CALCULATION (BEZET): 429 MS
R AXIS: 56 DEGREES
T AXIS: 54 DEGREES
VENTRICULAR RATE: 73 BPM

## 2018-03-05 PROCEDURE — 88175 CYTOPATH C/V AUTO FLUID REDO: CPT | Performed by: OBSTETRICS & GYNECOLOGY

## 2018-05-14 PROBLEM — Z87.19 HX SBO: Status: ACTIVE | Noted: 2018-05-14

## 2019-09-27 PROBLEM — E55.9 VITAMIN D DEFICIENCY: Status: ACTIVE | Noted: 2019-09-27

## 2019-09-27 PROBLEM — E53.8 B12 DEFICIENCY: Status: ACTIVE | Noted: 2019-09-27

## 2022-01-13 PROBLEM — F43.10 PTSD (POST-TRAUMATIC STRESS DISORDER): Status: ACTIVE | Noted: 2022-01-13

## 2022-01-13 PROBLEM — F33.1 MDD (MAJOR DEPRESSIVE DISORDER), RECURRENT EPISODE, MODERATE (HCC): Status: ACTIVE | Noted: 2022-01-13

## 2022-01-13 PROBLEM — F41.1 GAD (GENERALIZED ANXIETY DISORDER): Status: ACTIVE | Noted: 2022-01-13

## 2022-10-11 ENCOUNTER — HOSPITAL ENCOUNTER (INPATIENT)
Facility: HOSPITAL | Age: 32
LOS: 2 days | Discharge: HOME | End: 2022-10-14
Attending: EMERGENCY MEDICINE | Admitting: INTERNAL MEDICINE
Payer: MEDICARE

## 2022-10-11 DIAGNOSIS — R10.9 ABDOMINAL PAIN, ACUTE: Primary | ICD-10-CM

## 2022-10-11 DIAGNOSIS — K56.609 SBO (SMALL BOWEL OBSTRUCTION) (HCC): ICD-10-CM

## 2022-10-11 LAB
ANION GAP SERPL CALC-SCNC: 9 MMOL/L (ref 0–18)
B-HCG UR QL: NEGATIVE
BASOPHILS # BLD AUTO: 0.04 X10(3) UL (ref 0–0.2)
BASOPHILS NFR BLD AUTO: 0.4 %
BILIRUB UR QL STRIP.AUTO: NEGATIVE
BUN BLD-MCNC: 12 MG/DL (ref 7–18)
CALCIUM BLD-MCNC: 9.4 MG/DL (ref 8.5–10.1)
CHLORIDE SERPL-SCNC: 101 MMOL/L (ref 98–112)
CLARITY UR REFRACT.AUTO: CLEAR
CO2 SERPL-SCNC: 25 MMOL/L (ref 21–32)
COLOR UR AUTO: YELLOW
CREAT BLD-MCNC: 0.83 MG/DL
EOSINOPHIL # BLD AUTO: 0.04 X10(3) UL (ref 0–0.7)
EOSINOPHIL NFR BLD AUTO: 0.4 %
ERYTHROCYTE [DISTWIDTH] IN BLOOD BY AUTOMATED COUNT: 12.6 %
GFR SERPLBLD BASED ON 1.73 SQ M-ARVRAT: 96 ML/MIN/1.73M2 (ref 60–?)
GLUCOSE BLD-MCNC: 183 MG/DL (ref 70–99)
GLUCOSE UR STRIP.AUTO-MCNC: NEGATIVE MG/DL
HCT VFR BLD AUTO: 43.1 %
HGB BLD-MCNC: 14.3 G/DL
IMM GRANULOCYTES # BLD AUTO: 0.02 X10(3) UL (ref 0–1)
IMM GRANULOCYTES NFR BLD: 0.2 %
KETONES UR STRIP.AUTO-MCNC: NEGATIVE MG/DL
LEUKOCYTE ESTERASE UR QL STRIP.AUTO: NEGATIVE
LIPASE SERPL-CCNC: 147 U/L (ref 73–393)
LYMPHOCYTES # BLD AUTO: 0.25 X10(3) UL (ref 1–4)
LYMPHOCYTES NFR BLD AUTO: 2.4 %
MCH RBC QN AUTO: 29.4 PG (ref 26–34)
MCHC RBC AUTO-ENTMCNC: 33.2 G/DL (ref 31–37)
MCV RBC AUTO: 88.5 FL
MONOCYTES # BLD AUTO: 1.06 X10(3) UL (ref 0.1–1)
MONOCYTES NFR BLD AUTO: 10.4 %
NEUTROPHILS # BLD AUTO: 8.83 X10 (3) UL (ref 1.5–7.7)
NEUTROPHILS # BLD AUTO: 8.83 X10(3) UL (ref 1.5–7.7)
NEUTROPHILS NFR BLD AUTO: 86.2 %
NITRITE UR QL STRIP.AUTO: NEGATIVE
OSMOLALITY SERPL CALC.SUM OF ELEC: 284 MOSM/KG (ref 275–295)
PH UR STRIP.AUTO: 5 [PH] (ref 5–8)
PLATELET # BLD AUTO: 216 10(3)UL (ref 150–450)
POTASSIUM SERPL-SCNC: 3.7 MMOL/L (ref 3.5–5.1)
PROT UR STRIP.AUTO-MCNC: 30 MG/DL
RBC # BLD AUTO: 4.87 X10(6)UL
RBC UR QL AUTO: NEGATIVE
SODIUM SERPL-SCNC: 135 MMOL/L (ref 136–145)
SP GR UR STRIP.AUTO: 1.01 (ref 1–1.03)
UROBILINOGEN UR STRIP.AUTO-MCNC: <2 MG/DL
WBC # BLD AUTO: 10.2 X10(3) UL (ref 4–11)

## 2022-10-11 RX ORDER — KETOROLAC TROMETHAMINE 15 MG/ML
15 INJECTION, SOLUTION INTRAMUSCULAR; INTRAVENOUS ONCE
Status: COMPLETED | OUTPATIENT
Start: 2022-10-11 | End: 2022-10-11

## 2022-10-11 RX ORDER — MORPHINE SULFATE 4 MG/ML
4 INJECTION, SOLUTION INTRAMUSCULAR; INTRAVENOUS ONCE
Status: COMPLETED | OUTPATIENT
Start: 2022-10-11 | End: 2022-10-12

## 2022-10-12 ENCOUNTER — APPOINTMENT (OUTPATIENT)
Dept: CT IMAGING | Facility: HOSPITAL | Age: 32
End: 2022-10-12
Attending: EMERGENCY MEDICINE
Payer: MEDICARE

## 2022-10-12 ENCOUNTER — APPOINTMENT (OUTPATIENT)
Dept: GENERAL RADIOLOGY | Facility: HOSPITAL | Age: 32
End: 2022-10-12
Attending: EMERGENCY MEDICINE
Payer: MEDICARE

## 2022-10-12 ENCOUNTER — APPOINTMENT (OUTPATIENT)
Dept: GENERAL RADIOLOGY | Facility: HOSPITAL | Age: 32
End: 2022-10-12
Attending: STUDENT IN AN ORGANIZED HEALTH CARE EDUCATION/TRAINING PROGRAM
Payer: MEDICARE

## 2022-10-12 PROBLEM — E87.1 HYPONATREMIA: Status: ACTIVE | Noted: 2022-10-12

## 2022-10-12 PROBLEM — K56.609 SBO (SMALL BOWEL OBSTRUCTION) (HCC): Status: ACTIVE | Noted: 2022-10-12

## 2022-10-12 PROBLEM — R10.9 ABDOMINAL PAIN, ACUTE: Status: ACTIVE | Noted: 2022-10-12

## 2022-10-12 LAB
BASOPHILS # BLD AUTO: 0.04 X10(3) UL (ref 0–0.2)
BASOPHILS NFR BLD AUTO: 0.5 %
EOSINOPHIL # BLD AUTO: 0.16 X10(3) UL (ref 0–0.7)
EOSINOPHIL NFR BLD AUTO: 2 %
ERYTHROCYTE [DISTWIDTH] IN BLOOD BY AUTOMATED COUNT: 12.7 %
HCT VFR BLD AUTO: 39.4 %
HGB BLD-MCNC: 13.2 G/DL
IMM GRANULOCYTES # BLD AUTO: 0.01 X10(3) UL (ref 0–1)
IMM GRANULOCYTES NFR BLD: 0.1 %
LYMPHOCYTES # BLD AUTO: 0.88 X10(3) UL (ref 1–4)
LYMPHOCYTES NFR BLD AUTO: 11.1 %
MCH RBC QN AUTO: 30.5 PG (ref 26–34)
MCHC RBC AUTO-ENTMCNC: 33.5 G/DL (ref 31–37)
MCV RBC AUTO: 91 FL
MONOCYTES # BLD AUTO: 1.28 X10(3) UL (ref 0.1–1)
MONOCYTES NFR BLD AUTO: 16.2 %
NEUTROPHILS # BLD AUTO: 5.53 X10 (3) UL (ref 1.5–7.7)
NEUTROPHILS # BLD AUTO: 5.53 X10(3) UL (ref 1.5–7.7)
NEUTROPHILS NFR BLD AUTO: 70.1 %
PLATELET # BLD AUTO: 250 10(3)UL (ref 150–450)
RBC # BLD AUTO: 4.33 X10(6)UL
SARS-COV-2 RNA RESP QL NAA+PROBE: NOT DETECTED
WBC # BLD AUTO: 7.9 X10(3) UL (ref 4–11)

## 2022-10-12 PROCEDURE — 99223 1ST HOSP IP/OBS HIGH 75: CPT | Performed by: INTERNAL MEDICINE

## 2022-10-12 PROCEDURE — 74177 CT ABD & PELVIS W/CONTRAST: CPT | Performed by: EMERGENCY MEDICINE

## 2022-10-12 PROCEDURE — 74019 RADEX ABDOMEN 2 VIEWS: CPT | Performed by: STUDENT IN AN ORGANIZED HEALTH CARE EDUCATION/TRAINING PROGRAM

## 2022-10-12 PROCEDURE — 71045 X-RAY EXAM CHEST 1 VIEW: CPT | Performed by: EMERGENCY MEDICINE

## 2022-10-12 RX ORDER — ACETAMINOPHEN 10 MG/ML
1000 INJECTION, SOLUTION INTRAVENOUS EVERY 6 HOURS PRN
Status: DISCONTINUED | OUTPATIENT
Start: 2022-10-12 | End: 2022-10-14

## 2022-10-12 RX ORDER — HYDROMORPHONE HYDROCHLORIDE 1 MG/ML
0.4 INJECTION, SOLUTION INTRAMUSCULAR; INTRAVENOUS; SUBCUTANEOUS EVERY 2 HOUR PRN
Status: DISCONTINUED | OUTPATIENT
Start: 2022-10-12 | End: 2022-10-14

## 2022-10-12 RX ORDER — HYDROCODONE BITARTRATE AND ACETAMINOPHEN 5; 325 MG/1; MG/1
1 TABLET ORAL EVERY 4 HOURS PRN
Status: DISCONTINUED | OUTPATIENT
Start: 2022-10-12 | End: 2022-10-14

## 2022-10-12 RX ORDER — SODIUM CHLORIDE 9 MG/ML
INJECTION, SOLUTION INTRAVENOUS CONTINUOUS
Status: DISCONTINUED | OUTPATIENT
Start: 2022-10-12 | End: 2022-10-14

## 2022-10-12 RX ORDER — ENOXAPARIN SODIUM 100 MG/ML
40 INJECTION SUBCUTANEOUS DAILY
Status: DISCONTINUED | OUTPATIENT
Start: 2022-10-12 | End: 2022-10-12

## 2022-10-12 RX ORDER — PROCHLORPERAZINE EDISYLATE 5 MG/ML
5 INJECTION INTRAMUSCULAR; INTRAVENOUS EVERY 8 HOURS PRN
Status: DISCONTINUED | OUTPATIENT
Start: 2022-10-12 | End: 2022-10-14

## 2022-10-12 RX ORDER — CLONAZEPAM 0.5 MG/1
0.5 TABLET ORAL 3 TIMES DAILY
COMMUNITY

## 2022-10-12 RX ORDER — POTASSIUM CHLORIDE 14.9 MG/ML
20 INJECTION INTRAVENOUS ONCE
Status: COMPLETED | OUTPATIENT
Start: 2022-10-12 | End: 2022-10-12

## 2022-10-12 RX ORDER — MORPHINE SULFATE 4 MG/ML
4 INJECTION, SOLUTION INTRAMUSCULAR; INTRAVENOUS ONCE
Status: COMPLETED | OUTPATIENT
Start: 2022-10-12 | End: 2022-10-12

## 2022-10-12 RX ORDER — ACETAMINOPHEN 325 MG/1
650 TABLET ORAL EVERY 4 HOURS PRN
Status: DISCONTINUED | OUTPATIENT
Start: 2022-10-12 | End: 2022-10-14

## 2022-10-12 RX ORDER — HYDROMORPHONE HYDROCHLORIDE 1 MG/ML
0.8 INJECTION, SOLUTION INTRAMUSCULAR; INTRAVENOUS; SUBCUTANEOUS EVERY 2 HOUR PRN
Status: DISCONTINUED | OUTPATIENT
Start: 2022-10-12 | End: 2022-10-14

## 2022-10-12 RX ORDER — HYDROCODONE BITARTRATE AND ACETAMINOPHEN 5; 325 MG/1; MG/1
2 TABLET ORAL EVERY 4 HOURS PRN
Status: DISCONTINUED | OUTPATIENT
Start: 2022-10-12 | End: 2022-10-14

## 2022-10-12 RX ORDER — SERTRALINE HYDROCHLORIDE 100 MG/1
100 TABLET, FILM COATED ORAL DAILY
COMMUNITY

## 2022-10-12 RX ORDER — ONDANSETRON 2 MG/ML
4 INJECTION INTRAMUSCULAR; INTRAVENOUS EVERY 6 HOURS PRN
Status: DISCONTINUED | OUTPATIENT
Start: 2022-10-12 | End: 2022-10-14

## 2022-10-12 RX ORDER — MELATONIN
3 NIGHTLY PRN
Status: DISCONTINUED | OUTPATIENT
Start: 2022-10-12 | End: 2022-10-14

## 2022-10-12 RX ORDER — IOHEXOL 350 MG/ML
100 INJECTION, SOLUTION INTRAVENOUS
Status: COMPLETED | OUTPATIENT
Start: 2022-10-12 | End: 2022-10-12

## 2022-10-12 RX ORDER — ALBUTEROL SULFATE 90 UG/1
2 AEROSOL, METERED RESPIRATORY (INHALATION) EVERY 6 HOURS PRN
Status: DISCONTINUED | OUTPATIENT
Start: 2022-10-12 | End: 2022-10-14

## 2022-10-12 RX ORDER — HEPARIN SODIUM 5000 [USP'U]/ML
5000 INJECTION, SOLUTION INTRAVENOUS; SUBCUTANEOUS EVERY 8 HOURS SCHEDULED
Status: DISCONTINUED | OUTPATIENT
Start: 2022-10-12 | End: 2022-10-14

## 2022-10-12 RX ORDER — FLUTICASONE FUROATE AND VILANTEROL 200; 25 UG/1; UG/1
1 POWDER RESPIRATORY (INHALATION) DAILY
Refills: 5 | Status: DISCONTINUED | OUTPATIENT
Start: 2022-10-12 | End: 2022-10-14

## 2022-10-12 RX ORDER — MORPHINE SULFATE 4 MG/ML
INJECTION, SOLUTION INTRAMUSCULAR; INTRAVENOUS
Status: COMPLETED
Start: 2022-10-12 | End: 2022-10-12

## 2022-10-12 RX ORDER — KETOROLAC TROMETHAMINE 15 MG/ML
15 INJECTION, SOLUTION INTRAMUSCULAR; INTRAVENOUS EVERY 6 HOURS PRN
Status: DISCONTINUED | OUTPATIENT
Start: 2022-10-12 | End: 2022-10-14

## 2022-10-12 RX ORDER — KETOROLAC TROMETHAMINE 30 MG/ML
30 INJECTION, SOLUTION INTRAMUSCULAR; INTRAVENOUS EVERY 6 HOURS PRN
Status: DISCONTINUED | OUTPATIENT
Start: 2022-10-12 | End: 2022-10-14

## 2022-10-12 RX ORDER — HYDROMORPHONE HYDROCHLORIDE 1 MG/ML
0.2 INJECTION, SOLUTION INTRAMUSCULAR; INTRAVENOUS; SUBCUTANEOUS EVERY 2 HOUR PRN
Status: DISCONTINUED | OUTPATIENT
Start: 2022-10-12 | End: 2022-10-14

## 2022-10-12 NOTE — ED QUICK NOTES
Orders for admission, patient is aware of plan and ready to go upstairs. Any questions, please call ED RN Steven Singletary  at extension [phone]. Vaccinated?   Type of COVID test sent: Rapid  COVID Suspicion level: Low      Titratable drug(s) infusing:  Rate: NA    LOC at time of transport: A/O x 4    Other pertinent information:   16Fr NG right nare    CIWA score=  NIH score=

## 2022-10-12 NOTE — PLAN OF CARE
Pt resting after Dilaudid 0.4mg given this morning for c/o abd pain rating at 5. NG to LIS with return of green-brown drainage; abd soft with hypoactive bowel sounds; pt denies nausea. Pt instructed to call for assist when getting up. Call light in reach.

## 2022-10-12 NOTE — PROGRESS NOTES
NURSING ADMISSION NOTE      Patient admitted via ED  Oriented to room. Safety precautions initiated. Bed in low position. Call light in reach.

## 2022-10-13 LAB
ANION GAP SERPL CALC-SCNC: 4 MMOL/L (ref 0–18)
BUN BLD-MCNC: 16 MG/DL (ref 7–18)
CALCIUM BLD-MCNC: 8.5 MG/DL (ref 8.5–10.1)
CHLORIDE SERPL-SCNC: 110 MMOL/L (ref 98–112)
CO2 SERPL-SCNC: 29 MMOL/L (ref 21–32)
CREAT BLD-MCNC: 0.62 MG/DL
EST. AVERAGE GLUCOSE BLD GHB EST-MCNC: 111 MG/DL (ref 68–126)
GFR SERPLBLD BASED ON 1.73 SQ M-ARVRAT: 121 ML/MIN/1.73M2 (ref 60–?)
GLUCOSE BLD-MCNC: 85 MG/DL (ref 70–99)
HBA1C MFR BLD: 5.5 % (ref ?–5.7)
MAGNESIUM SERPL-MCNC: 2 MG/DL (ref 1.6–2.6)
OSMOLALITY SERPL CALC.SUM OF ELEC: 296 MOSM/KG (ref 275–295)
POTASSIUM SERPL-SCNC: 3.7 MMOL/L (ref 3.5–5.1)
POTASSIUM SERPL-SCNC: 3.7 MMOL/L (ref 3.5–5.1)
SODIUM SERPL-SCNC: 143 MMOL/L (ref 136–145)

## 2022-10-13 PROCEDURE — 99232 SBSQ HOSP IP/OBS MODERATE 35: CPT | Performed by: INTERNAL MEDICINE

## 2022-10-13 NOTE — PLAN OF CARE
NG tube clamped at 1200 today. Pt has been tolerating clears. Plan to keep tube clamped over night and possibly remove tomorrow. Pt reports moderate abdominal pain. IV pain medication PRN. IVF infusing. Up ad kristina. Voiding without difficulty. Plan of care reviewed. All questions answered. 1800 Pt tearful and complaining of severe jaw pain in addition to her abdominal pain. Reports clenching jaw because of the discomfort the tube has been causing her. IV pain medication. Pt is agreeable to keeping the tube in at this time.      Problem: GASTROINTESTINAL - ADULT  Goal: Minimal or absence of nausea and vomiting  Description: INTERVENTIONS:  - Maintain adequate hydration with IV or PO as ordered and tolerated  - Nasogastric tube to low intermittent suction as ordered  - Evaluate effectiveness of ordered antiemetic medications  - Provide nonpharmacologic comfort measures as appropriate  - Advance diet as tolerated, if ordered  - Obtain nutritional consult as needed  - Evaluate fluid balance  Outcome: Progressing  Goal: Maintains or returns to baseline bowel function  Description: INTERVENTIONS:  - Assess bowel function  - Maintain adequate hydration with IV or PO as ordered and tolerated  - Evaluate effectiveness of GI medications  - Encourage mobilization and activity  - Obtain nutritional consult as needed  - Establish a toileting routine/schedule  - Consider collaborating with pharmacy to review patient's medication profile  Outcome: Progressing  Goal: Maintains adequate nutritional intake (undernourished)  Description: INTERVENTIONS:  - Monitor percentage of each meal consumed  - Identify factors contributing to decreased intake, treat as appropriate  - Assist with meals as needed  - Monitor I&O, WT and lab values  - Obtain nutritional consult as needed  - Optimize oral hygiene and moisture  - Encourage food from home; allow for food preferences  - Enhance eating environment  Outcome: Progressing  Goal: Achieves appropriate nutritional intake (bariatric)  Description: INTERVENTIONS:  - Monitor for over-consumption  - Identify factors contributing to increased intake, treat as appropriate  - Monitor I&O, WT and lab values  - Obtain nutritional consult as needed  - Evaluate psychosocial factors contributing to over-consumption  Outcome: Progressing     Problem: SKIN/TISSUE INTEGRITY - ADULT  Goal: Skin integrity remains intact  Description: INTERVENTIONS  - Assess and document risk factors for pressure ulcer development  - Assess and document skin integrity  - Monitor for areas of redness and/or skin breakdown  - Initiate interventions, skin care algorithm/standards of care as needed  Outcome: Progressing     Problem: PAIN - ADULT  Goal: Verbalizes/displays adequate comfort level or patient's stated pain goal  Description: INTERVENTIONS:  - Encourage pt to monitor pain and request assistance  - Assess pain using appropriate pain scale  - Administer analgesics based on type and severity of pain and evaluate response  - Implement non-pharmacological measures as appropriate and evaluate response  - Consider cultural and social influences on pain and pain management  - Manage/alleviate anxiety  - Utilize distraction and/or relaxation techniques  - Monitor for opioid side effects  - Notify MD/LIP if interventions unsuccessful or patient reports new pain  - Anticipate increased pain with activity and pre-medicate as appropriate  Outcome: Progressing     Problem: SAFETY ADULT - FALL  Goal: Free from fall injury  Description: INTERVENTIONS:  - Assess pt frequently for physical needs  - Identify cognitive and physical deficits and behaviors that affect risk of falls.   - [place] fall precautions as indicated by assessment.  - Educate pt/family on patient safety including physical limitations  - Instruct pt to call for assistance with activity based on assessment  - Modify environment to reduce risk of injury  - Provide assistive devices as appropriate  - Consider OT/PT consult to assist with strengthening/mobility  - Encourage toileting schedule  Outcome: Progressing

## 2022-10-13 NOTE — PLAN OF CARE
Pt vitals stable, A&O x4. Continuous pulse ox in place. Pt denied chest pain, shortness of breath, and calf pain. Pt stated having throat pain due to NG. NG to LIS marked at 60cm and draining green fluid. Pt stated passing gas. To remain NPO. IVF infusing. Bed locked in low position, call light in reach, rounding provided.        Problem: GASTROINTESTINAL - ADULT  Goal: Minimal or absence of nausea and vomiting  Description: INTERVENTIONS:  - Maintain adequate hydration with IV or PO as ordered and tolerated  - Nasogastric tube to low intermittent suction as ordered  - Evaluate effectiveness of ordered antiemetic medications  - Provide nonpharmacologic comfort measures as appropriate  - Advance diet as tolerated, if ordered  - Obtain nutritional consult as needed  - Evaluate fluid balance  Outcome: Progressing  Goal: Maintains or returns to baseline bowel function  Description: INTERVENTIONS:  - Assess bowel function  - Maintain adequate hydration with IV or PO as ordered and tolerated  - Evaluate effectiveness of GI medications  - Encourage mobilization and activity  - Obtain nutritional consult as needed  - Establish a toileting routine/schedule  - Consider collaborating with pharmacy to review patient's medication profile  Outcome: Progressing  Goal: Maintains adequate nutritional intake (undernourished)  Description: INTERVENTIONS:  - Monitor percentage of each meal consumed  - Identify factors contributing to decreased intake, treat as appropriate  - Assist with meals as needed  - Monitor I&O, WT and lab values  - Obtain nutritional consult as needed  - Optimize oral hygiene and moisture  - Encourage food from home; allow for food preferences  - Enhance eating environment  Outcome: Progressing  Goal: Achieves appropriate nutritional intake (bariatric)  Description: INTERVENTIONS:  - Monitor for over-consumption  - Identify factors contributing to increased intake, treat as appropriate  - Monitor I&O, WT and lab values  - Obtain nutritional consult as needed  - Evaluate psychosocial factors contributing to over-consumption  Outcome: Progressing     Problem: SKIN/TISSUE INTEGRITY - ADULT  Goal: Skin integrity remains intact  Description: INTERVENTIONS  - Assess and document risk factors for pressure ulcer development  - Assess and document skin integrity  - Monitor for areas of redness and/or skin breakdown  - Initiate interventions, skin care algorithm/standards of care as needed  Outcome: Progressing     Problem: PAIN - ADULT  Goal: Verbalizes/displays adequate comfort level or patient's stated pain goal  Description: INTERVENTIONS:  - Encourage pt to monitor pain and request assistance  - Assess pain using appropriate pain scale  - Administer analgesics based on type and severity of pain and evaluate response  - Implement non-pharmacological measures as appropriate and evaluate response  - Consider cultural and social influences on pain and pain management  - Manage/alleviate anxiety  - Utilize distraction and/or relaxation techniques  - Monitor for opioid side effects  - Notify MD/LIP if interventions unsuccessful or patient reports new pain  - Anticipate increased pain with activity and pre-medicate as appropriate  Outcome: Progressing     Problem: SAFETY ADULT - FALL  Goal: Free from fall injury  Description: INTERVENTIONS:  - Assess pt frequently for physical needs  - Identify cognitive and physical deficits and behaviors that affect risk of falls.   - [place] fall precautions as indicated by assessment.  - Educate pt/family on patient safety including physical limitations  - Instruct pt to call for assistance with activity based on assessment  - Modify environment to reduce risk of injury  - Provide assistive devices as appropriate  - Consider OT/PT consult to assist with strengthening/mobility  - Encourage toileting schedule  Outcome: Progressing

## 2022-10-14 VITALS
DIASTOLIC BLOOD PRESSURE: 84 MMHG | BODY MASS INDEX: 25.99 KG/M2 | TEMPERATURE: 99 F | HEART RATE: 70 BPM | RESPIRATION RATE: 18 BRPM | WEIGHT: 156 LBS | OXYGEN SATURATION: 97 % | HEIGHT: 64.96 IN | SYSTOLIC BLOOD PRESSURE: 141 MMHG

## 2022-10-14 PROCEDURE — 99239 HOSP IP/OBS DSCHRG MGMT >30: CPT | Performed by: INTERNAL MEDICINE

## 2022-10-14 NOTE — PLAN OF CARE
Problem: GASTROINTESTINAL - ADULT  Goal: Minimal or absence of nausea and vomiting  Description: INTERVENTIONS:  - Maintain adequate hydration with IV or PO as ordered and tolerated  - Nasogastric tube to low intermittent suction as ordered  - Evaluate effectiveness of ordered antiemetic medications  - Provide nonpharmacologic comfort measures as appropriate  - Advance diet as tolerated, if ordered  - Obtain nutritional consult as needed  - Evaluate fluid balance  Outcome: Progressing  Goal: Maintains or returns to baseline bowel function  Description: INTERVENTIONS:  - Assess bowel function  - Maintain adequate hydration with IV or PO as ordered and tolerated  - Evaluate effectiveness of GI medications  - Encourage mobilization and activity  - Obtain nutritional consult as needed  - Establish a toileting routine/schedule  - Consider collaborating with pharmacy to review patient's medication profile  Outcome: Progressing     Problem: SKIN/TISSUE INTEGRITY - ADULT  Goal: Skin integrity remains intact  Description: INTERVENTIONS  - Assess and document risk factors for pressure ulcer development  - Assess and document skin integrity  - Monitor for areas of redness and/or skin breakdown  - Initiate interventions, skin care algorithm/standards of care as needed  Outcome: Progressing     Problem: PAIN - ADULT  Goal: Verbalizes/displays adequate comfort level or patient's stated pain goal  Description: INTERVENTIONS:  - Encourage pt to monitor pain and request assistance  - Assess pain using appropriate pain scale  - Administer analgesics based on type and severity of pain and evaluate response  - Implement non-pharmacological measures as appropriate and evaluate response  - Consider cultural and social influences on pain and pain management  - Manage/alleviate anxiety  - Utilize distraction and/or relaxation techniques  - Monitor for opioid side effects  - Notify MD/LIP if interventions unsuccessful or patient reports new pain  - Anticipate increased pain with activity and pre-medicate as appropriate  Outcome: Progressing     Problem: SAFETY ADULT - FALL  Goal: Free from fall injury  Description: INTERVENTIONS:  - Assess pt frequently for physical needs  - Identify cognitive and physical deficits and behaviors that affect risk of falls. - [place] fall precautions as indicated by assessment.  - Educate pt/family on patient safety including physical limitations  - Instruct pt to call for assistance with activity based on assessment  - Modify environment to reduce risk of injury  - Provide assistive devices as appropriate  - Consider OT/PT consult to assist with strengthening/mobility  - Encourage toileting schedule  Outcome: Progressing   Alert & oriented x4. With tolerable pain. Right side of face slightly swollen (not new). Right NGT was removed by . Will start on full liquids. Ambulation in hallway encouraged. Plan of care discussed with patient. Will monitor.

## 2022-10-14 NOTE — PLAN OF CARE
Assumed pt care. Pt c/o of slight abd pain,declined pain meds. . Reports flatus. Pt will have soft diet for dinner and then go home if all does fine. Discharge instructions reviwed, pt verbalized understanding.

## 2022-10-14 NOTE — PLAN OF CARE
Patient alert and oriented x4. On room air. Patient complains of throat discomfort from the NG tube. Right sided facial swelling related to the NG tube, patient rates pain 7/10 and PRN Toradol administered. IVF infusing per order. Plan of care discussed with patient. Call light within reach.      Problem: GASTROINTESTINAL - ADULT  Goal: Minimal or absence of nausea and vomiting  Description: INTERVENTIONS:  - Maintain adequate hydration with IV or PO as ordered and tolerated  - Nasogastric tube to low intermittent suction as ordered  - Evaluate effectiveness of ordered antiemetic medications  - Provide nonpharmacologic comfort measures as appropriate  - Advance diet as tolerated, if ordered  - Obtain nutritional consult as needed  - Evaluate fluid balance  Outcome: Progressing  Goal: Maintains or returns to baseline bowel function  Description: INTERVENTIONS:  - Assess bowel function  - Maintain adequate hydration with IV or PO as ordered and tolerated  - Evaluate effectiveness of GI medications  - Encourage mobilization and activity  - Obtain nutritional consult as needed  - Establish a toileting routine/schedule  - Consider collaborating with pharmacy to review patient's medication profile  Outcome: Progressing  Goal: Maintains adequate nutritional intake (undernourished)  Description: INTERVENTIONS:  - Monitor percentage of each meal consumed  - Identify factors contributing to decreased intake, treat as appropriate  - Assist with meals as needed  - Monitor I&O, WT and lab values  - Obtain nutritional consult as needed  - Optimize oral hygiene and moisture  - Encourage food from home; allow for food preferences  - Enhance eating environment  Outcome: Progressing  Goal: Achieves appropriate nutritional intake (bariatric)  Description: INTERVENTIONS:  - Monitor for over-consumption  - Identify factors contributing to increased intake, treat as appropriate  - Monitor I&O, WT and lab values  - Obtain nutritional consult as needed  - Evaluate psychosocial factors contributing to over-consumption  Outcome: Progressing     Problem: SKIN/TISSUE INTEGRITY - ADULT  Goal: Skin integrity remains intact  Description: INTERVENTIONS  - Assess and document risk factors for pressure ulcer development  - Assess and document skin integrity  - Monitor for areas of redness and/or skin breakdown  - Initiate interventions, skin care algorithm/standards of care as needed  Outcome: Progressing     Problem: PAIN - ADULT  Goal: Verbalizes/displays adequate comfort level or patient's stated pain goal  Description: INTERVENTIONS:  - Encourage pt to monitor pain and request assistance  - Assess pain using appropriate pain scale  - Administer analgesics based on type and severity of pain and evaluate response  - Implement non-pharmacological measures as appropriate and evaluate response  - Consider cultural and social influences on pain and pain management  - Manage/alleviate anxiety  - Utilize distraction and/or relaxation techniques  - Monitor for opioid side effects  - Notify MD/LIP if interventions unsuccessful or patient reports new pain  - Anticipate increased pain with activity and pre-medicate as appropriate  Outcome: Progressing     Problem: SAFETY ADULT - FALL  Goal: Free from fall injury  Description: INTERVENTIONS:  - Assess pt frequently for physical needs  - Identify cognitive and physical deficits and behaviors that affect risk of falls.   - [place] fall precautions as indicated by assessment.  - Educate pt/family on patient safety including physical limitations  - Instruct pt to call for assistance with activity based on assessment  - Modify environment to reduce risk of injury  - Provide assistive devices as appropriate  - Consider OT/PT consult to assist with strengthening/mobility  - Encourage toileting schedule  Outcome: Progressing

## 2022-10-15 NOTE — PAYOR COMM NOTE
--------------  DISCHARGE REVIEW    Payor: Rojas Wade  #:  [id number]  Authorization Number: [id number]    Admit date: 10/12/22  Admit time:   3:30 AM  Discharge Date: 10/14/2022  7:05 PM     Admitting Physician: Amy Zavala DO  Attending Physician:  Cookie att. providers found  Primary Care Physician: Tracey Morris       Discharge Summary Notes    No notes of this type exist for this encounter.

## 2022-10-28 NOTE — CDS QUERY
Hyponatremia  Prem Griffiths    Dear Dr. Suni Elias,   Clinical information (provided below) includes documentation of hyponatremia. For accurate ICD-10-CM code assignment to reflect severity of illness and risk of mortality,  BASED ON YOUR CLINICAL JUDGMENT, PLEASE SELECT  THE MOST APPRORIATE RESPONSE:  [  ] Hyponatremia was treated and resolved  [ x ] Clinically insignificant serum sodium below normal range  [  ] Other (please specify):   [  ] Clinically unable to determine      Documentation from the Medical Record:    10/12 ED Provider Note:   BASIC METABOLIC PANEL (8) - Abnormal; Notable for the following components:     Glucose 183 (*)       Sodium 135 (*)     Hospital Problems POA:        Acute Abdominal Pain       Small Bowel Obstruction       Hyponatremia      H&P: SBO, gerd, asthma/emphysema, hyperglycemia      10/14 Hospitalist Progress Note:         Lab 10/11/22  2215 10/13/22  0541   * 85   BUN 12 16   CREATSERUM 0.83 0.62   CA 9.4 8.5   * 143   K 3.7 3.7  3.7          For questions regarding this query, please contact Clinical Documentation :   Zahraa Ventura RN CCDS, Cell# [phone]                          Thank you!                                                          THIS FORM IS A PERMANENT PART OF THE MEDICAL RECORD

## 2023-05-02 NOTE — ED INITIAL ASSESSMENT (HPI)
Here for eval of nausea/vomiting/diarrhea. Symptoms started about 6 days ago. Patient states she does have a hx of bowel obstruction and thinks it could be that.

## 2023-05-04 NOTE — PROGRESS NOTES
Contacted patient about positive result and azithromycin rx. Symptoms have resolved at this time so she will hold off on abx. All questions answered at this time.

## 2024-04-02 NOTE — PLAN OF CARE
Problem: Diabetes/Glucose Control  Goal: Glucose maintained within prescribed range  Description: INTERVENTIONS:  - Monitor Blood Glucose as ordered  - Assess for signs and symptoms of hyperglycemia and hypoglycemia  - Administer ordered medications to maintain glucose within target range  - Assess barriers to adequate nutritional intake and initiate nutrition consult as needed  - Instruct patient on self management of diabetes  Outcome: Progressing     Problem: PAIN - ADULT  Goal: Verbalizes/displays adequate comfort level or patient's stated pain goal  Description: INTERVENTIONS:  - Encourage pt to monitor pain and request assistance  - Assess pain using appropriate pain scale  - Administer analgesics based on type and severity of pain and evaluate response  - Implement non-pharmacological measures as appropriate and evaluate response  - Consider cultural and social influences on pain and pain management  - Manage/alleviate anxiety  - Utilize distraction and/or relaxation techniques  - Monitor for opioid side effects  - Notify MD/LIP if interventions unsuccessful or patient reports new pain  - Anticipate increased pain with activity and pre-medicate as appropriate  Outcome: Progressing     Problem: RISK FOR INFECTION - ADULT  Goal: Absence of fever/infection during anticipated neutropenic period  Description: INTERVENTIONS  - Monitor WBC  - Administer growth factors as ordered  - Implement neutropenic guidelines  Outcome: Progressing     Problem: SAFETY ADULT - FALL  Goal: Free from fall injury  Description: INTERVENTIONS:  - Assess pt frequently for physical needs  - Identify cognitive and physical deficits and behaviors that affect risk of falls.  - [place] fall precautions as indicated by assessment.  - Educate pt/family on patient safety including physical limitations  - Instruct pt to call for assistance with activity based on assessment  - Modify environment to reduce risk of injury  - Provide assistive  devices as appropriate  - Consider OT/PT consult to assist with strengthening/mobility  - Encourage toileting schedule  Outcome: Progressing     Problem: DISCHARGE PLANNING  Goal: Discharge to home or other facility with appropriate resources  Description: INTERVENTIONS:  - Identify barriers to discharge w/pt and caregiver  - Include patient/family/discharge partner in discharge planning  - Arrange for needed discharge resources and transportation as appropriate  - Identify discharge learning needs (meds, wound care, etc)  - Arrange for interpreters to assist at discharge as needed  - Consider post-discharge preferences of patient/family/discharge partner  - Complete POLST form as appropriate  - Assess patient's ability to be responsible for managing their own health  - Refer to Case Management Department for coordinating discharge planning if the patient needs post-hospital services based on physician/LIP order or complex needs related to functional status, cognitive ability or social support system  Outcome: Progressing   Alert and orient x 4 , on room air , was desats  after pain medicine , started o2 2l/nc , notified MD . O2 sat 98% , exp wheezing noted . Diminish BS , NPO except ice chips , abdomen soft , very tender on upper abdomen , rating pain 9/10 , provided pain medicine as needed , voids well , c/o nausea , provided medicine as needed , n/g tube to LIS , draining light brown in color , getting IVF , up in room , plan of care discussed , answered all questions . Verbalized understanding , will continue to monitor

## 2024-04-02 NOTE — PLAN OF CARE
NURSING ADMISSION NOTE      Patient admitted via Cart  Oriented to room.  Safety precautions initiated.  Bed in low position.  Call light in reach.      PT alert x 4. VSS on RA. NGT to R felicia to LIS/ IVF infusing. Medicated for pain. Poc discussed. All current needs met.

## 2024-04-02 NOTE — ED PROVIDER NOTES
Patient Seen in: [facility] Emergency Department      History     Chief Complaint   Patient presents with    Abdomen/Flank Pain     Stated Complaint: abd pain    Subjective:   HPI    34-year-old female complaining of abdominal pain patient states this started a few hours ago about noon today she has crampy generalized somewhat more in the upper abdomen pain that fluctuates intensity to about a 6 out of 10 currently was worse earlier she has nausea but no vomiting she had a bowel movement earlier this morning.  She states that she has a history of 2 or 3 previous bowel obstructions this seems similar.  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  She had a tracheostomy and was in the NICU the first 2 years of her life.  She denies any fever or chills    Objective:   Past Medical History:   Diagnosis Date    Asthma ([facility]) 2014    Chronic migraine without aura without status migrainosus, not intractable 2015    Chronic nonintractable headache, unspecified headache type 2015    Congenital absence of lobe of lung 2014    left side     Diarrhea 2015    Emphysema lung ([facility]) 2015    realted to lung stretch due to lack of full development of the left lung    Epigastric pain 2015    H/O hydrocephalus s/p  shunt as  2014    Hx SBO 2018    MDD (major depressive disorder), recurrent episode, moderate ([facility]) 2022    Migraines               Past Surgical History:   Procedure Laterality Date    ERCP - REFERRAL      PD stent placed; CBD sludge removed    OTHER AMBL SURG  birth    trach    OTHER SURGICAL HISTORY  birth    ex lap, repositioning of organs, shunts    OTHER SURGICAL HISTORY      left artificial diaphragm    PARAGARD, IUD  2015    REMOVAL OF LUNG,LOBECTOMY                  Social History      Socioeconomic History    Marital status:    Tobacco Use    Smoking status: Never    Smokeless tobacco: Never   Vaping Use    Vaping Use: Never used   Substance and Sexual Activity    Alcohol use: No     Comment: rare    Drug use: No    Sexual activity: Yes     Partners: Male     Birth control/protection: Condom, I.U.D.              Review of Systems    Positive for stated complaint: abd pain  Other systems are as noted in HPI.  Constitutional and vital signs reviewed.      All other systems reviewed and negative except as noted above.    Physical Exam     ED Triage Vitals [04/01/24 1936]   BP (!) 142/104   Pulse 82   Resp 16   Temp 98.2 °F (36.8 °C)   Temp src    SpO2 96 %   O2 Device None (Room air)       Current:/85   Pulse 78   Temp 98.2 °F (36.8 °C)   Resp 16   Ht 162.6 cm (5' 4\")   Wt 77.1 kg   SpO2 97%   BMI 29.18 kg/m²         Physical Exam    Patient is alert orient x 3 no acute distress HEENT exam within normal limits neck there is no lymphadenopathy or JVD lungs are clear cardiovascular exam shows regular rate and rhythm without murmurs abdomen is soft bowel sounds are active there is mild to moderate distention there is moderate tenderness throughout more so in the upper abdomen in the right lower quadrant no rebound or guarding.    ED Course     Labs Reviewed   COMP METABOLIC PANEL (14) - Abnormal; Notable for the following components:       Result Value    Glucose 132 (*)     Sodium 134 (*)     All other components within normal limits   URINALYSIS WITH CULTURE REFLEX - Abnormal; Notable for the following components:    Protein Urine 20 (*)     All other components within normal limits   CBC W/ DIFFERENTIAL - Abnormal; Notable for the following components:    WBC 13.4 (*)     Neutrophil Absolute Prelim 9.71 (*)     Neutrophil Absolute 9.71 (*)     Monocyte Absolute 1.19 (*)     All other components within normal limits   LIPASE - Normal   POCT PREGNANCY URINE - Normal   CBC WITH  DIFFERENTIAL WITH PLATELET    Narrative:     The following orders were created for panel order CBC With Differential With Platelet.  Procedure                               Abnormality         Status                     ---------                               -----------         ------                     CBC W/ DIFFERENTIAL[[id number]]          Abnormal            Final result                 Please view results for these tests on the individual orders.             CT ABDOMEN+PELVIS(CONTRAST ONLY)(CPT=74177)    Result Date: 4/1/2024  CONCLUSION:  1. Small bowel obstruction with transition point in the right mid abdomen.  Mild interloop ascites is present. 2. 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   LOCATION:  [facility]   Dictated by (CST): Abdirahman Saavedra MD on 4/01/2024 at 10:22 PM     Finalized by (CST): Abdirahman Saavedra MD on 4/01/2024 at 10:31 PM      Images independent review show small bowel obstruction       Initial differential diagnoses considered but not limited to includes  MDM      Small bowel obstruction gastroenteritis constipation appendicitis peptic ulcer disease pancreatitis    Case was discussed with the hospitalist and general surgery Dr. Mac recommended NG tube placement.  Patient will be admitted for bowel rest IV fluids pain medication.                               Medical Decision Making      Disposition and Plan     Clinical Impression:  1. Small bowel obstruction (HCC)         Disposition:  There is no disposition on file for this visit.  There is no disposition time on file for this visit.    Follow-up:  No follow-up provider specified.        Medications Prescribed:  Current Discharge Medication List

## 2024-04-02 NOTE — CONSULTS
[facility]  Report of Consultation    Yunior Parks Patient Status:  Inpatient    1990 MRN [id number]   Location [facility] 3NW-A Attending Reji Napoles MD   Hosp Day # 0 PCP KHAI FRANCO     24    Reason for Consultation:    Surgical Consultation     CC:   Chief Complaint   Patient presents with    Abdomen/Flank Pain        History of Present Illness:    Yunior Parks is a a(n) 34 year old female. Patient reports abdominal pain that began yesterday, noted constant pressure/pain in her upper abdomen and radiating to the right, nausea and emesis associated, no flatus.   She has had 4 previous SBO last one was approx 1 year ago  CT scan obtained revealing SBO with transition point.   Ng tube was placed, mild improvement in pain this morning, no flatus   As an infant she had an artifical diaphragm as well as a bowel resection, she was in NICU for first 2 years of her life, lap cholecystectomy 6 years ago     Past Medical History:    Past Medical History:   Diagnosis Date    Asthma (HCC) 2014    Chronic migraine without aura without status migrainosus, not intractable 2015    Chronic nonintractable headache, unspecified headache type 2015    Congenital absence of lobe of lung 2014    left side     Diarrhea 2015    Emphysema lung (HCC) 2015    realted to lung stretch due to lack of full development of the left lung    Epigastric pain 2015    H/O hydrocephalus s/p  shunt as  2014    Hx SBO 2018    MDD (major depressive disorder), recurrent episode, moderate (HCC) 2022    Migraines        Past Surgical History:    Past Surgical History:   Procedure Laterality Date    ERCP - REFERRAL      PD stent placed; CBD sludge removed    OTHER AMBL SURG  birth    trach    OTHER SURGICAL HISTORY  birth    ex lap, repositioning of organs, shunts    OTHER SURGICAL HISTORY      left artificial diaphragm    PARAGARD, IUD  2015    REMOVAL OF  LUNG,LOBECTOMY         Family History:    Family History   Problem Relation Age of Onset    Lipids Mother     Hypertension Mother     Heart Disorder Mother     Diabetes Mother        Social History:     reports that she has never smoked. She has never used smokeless tobacco. She reports that she does not drink alcohol and does not use drugs.    Allergies:    Allergies   Allergen Reactions    Pcn [Penicillins] SWELLING    Lovenox [Enoxaparin] RASH       Current Medications:      Current Facility-Administered Medications:     albuterol (Ventolin) (2.5 MG/3ML) 0.083% nebulizer solution 2.5 mg, 2.5 mg, Nebulization, Q6H PRN    melatonin tab 3 mg, 3 mg, Oral, Nightly PRN    sodium chloride 0.9% infusion, , Intravenous, Continuous    HYDROmorphone (Dilaudid) 1 MG/ML injection 0.2 mg, 0.2 mg, Intravenous, Q2H PRN **OR** HYDROmorphone (Dilaudid) 1 MG/ML injection 0.4 mg, 0.4 mg, Intravenous, Q2H PRN **OR** HYDROmorphone (Dilaudid) 1 MG/ML injection 0.8 mg, 0.8 mg, Intravenous, Q2H PRN    acetaminophen (Tylenol Extra Strength) tab 500 mg, 500 mg, Oral, Q4H PRN    ondansetron (Zofran) 4 MG/2ML injection 4 mg, 4 mg, Intravenous, Q6H PRN    prochlorperazine (Compazine) 10 MG/2ML injection 5 mg, 5 mg, Intravenous, Q8H PRN    polyethylene glycol (PEG 3350) (Miralax) 17 g oral packet 17 g, 17 g, Oral, Daily PRN    sennosides (Senokot) tab 17.2 mg, 17.2 mg, Oral, Nightly PRN    bisacodyl (Dulcolax) 10 MG rectal suppository 10 mg, 10 mg, Rectal, Daily PRN    fleet enema (Fleet) 7-19 GM/118ML rectal enema 133 mL, 1 enema, Rectal, Once PRN    heparin (Porcine) 5000 UNIT/ML injection 5,000 Units, 5,000 Units, Subcutaneous, Q8H IGGY    Home Medications:    No current facility-administered medications on file prior to encounter.     Current Outpatient Medications on File Prior to Encounter   Medication Sig Dispense Refill    LEXAPRO 20 MG Oral Tab Take 1 tablet (20 mg total) by mouth daily.      lamoTRIgine 100 MG Oral Tab Take 1 tablet  (100 mg total) by mouth daily.      albuterol sulfate (2.5 MG/3ML) 0.083% Inhalation Nebu Soln Inhale 3 mL (2.5 mg total) into the lungs.      Albuterol Sulfate 108 (90 Base) MCG/ACT Inhalation Aerosol Powder, Breath Activated Inhale 2 puffs into the lungs every 6 (six) hours as needed. For wheezing or shortness of breath      Mometasone Furo-Formoterol Fum (DULERA) 200-5 MCG/ACT Inhalation Aerosol Inhale 2 puffs into the lungs 2 (two) times daily. 1 Inhaler 5    sertraline 100 MG Oral Tab Take 100 mg by mouth daily. Patient takes two tablets once a day      clonazePAM 0.5 MG Oral Tab Take 0.5 mg by mouth 3 (three) times daily.      famoTIDine 20 MG Oral Tab Take 1 tablet (20 mg total) by mouth nightly as needed for Heartburn. 30 tablet 0    ondansetron (ZOFRAN ODT) 4 MG Oral Tablet Dispersible Take 1 tablet (4 mg total) by mouth every 8 (eight) hours as needed for Nausea. 10 tablet 0    omeprazole 20 MG Oral Capsule Delayed Release Take 1 capsule (20 mg total) by mouth once daily. 30 capsule 2    PARAGARD INTRAUTERINE COPPER IU by Intrauterine route.         Review of Systems:    10 point review performed pertinent positives and negatives per history of present illness.    Physical Exam:    Blood pressure (!) 140/94, pulse 89, temperature 98.2 °F (36.8 °C), temperature source Oral, resp. rate 18, height 5' 4\" (1.626 m), weight 175 lb 9.6 oz (79.7 kg), SpO2 94%, not currently breastfeeding.    GENERAL: Alert and oriented x 3, well developed, well nourished female, in no apparent distress    SKIN: anicteric    RESPIRATORY: non labored breathing    CARDIOVASCULAR: RRR    ABDOMEN: soft, mildly distended, tenderness noted in epigatrium and extending toward the right upper quadrant    LYMPHATIC: no lymphadenopathy    EXTREMITIES: no cyanosis, clubbing or edema    .    Laboratory Data:  Recent Labs   Lab 04/01/24  2102 04/02/24  0515   WBC 13.4* 9.9   HGB 13.6 13.1   MCV 90.9 91.6   .0 261.0       Recent Labs   Lab  04/01/24 2102 04/02/24  0515   * 140   K 3.8 4.3    108   CO2 25.0 28.0   BUN 11 8*   CREATSERUM 0.81 0.68   * 118*   CA 9.0 8.5       Recent Labs   Lab 04/01/24 2102   ALT 27   AST 19   ALB 4.0       No results for input(s): \"TROP\" in the last 168 hours.          Radiology:    XR CHEST AP PORTABLE  (CPT=71045)    Result Date: 4/2/2024  PROCEDURE:  XR CHEST AP PORTABLE  (CPT=71045)  TECHNIQUE:  AP chest radiograph was obtained.  COMPARISON:  [facility] , XR, XR CHEST AP PORTABLE  (CPT=71045), 10/12/2022, 2:54 AM.  [facility] , XR, XR CHEST PA + LAT CHEST (CPT=71020), 12/31/2017, 4:12 PM.  INDICATIONS:  tube verification  PATIENT STATED HISTORY: (As transcribed by Technologist)  Verify tube placement.    FINDINGS:  NG/OG tube courses below the diaphragm, tip terminates in the stomach, side port remains within the lower esophagus.  Advancement is recommended.            CONCLUSION:  See above   LOCATION:  [facility]      Dictated by (CST): Abdirahman Saavedra MD on 4/02/2024 at 0:13 AM     Finalized by (CST): Abdirahman Saavedra MD on 4/02/2024 at 0:13 AM       CT ABDOMEN+PELVIS(CONTRAST ONLY)(CPT=74177)    Result Date: 4/1/2024  PROCEDURE:  CT ABDOMEN+PELVIS (CONTRAST ONLY) (CPT=74177)  COMPARISON:  [facility] , CT, CT ABDOMEN+PELVIS(CONTRAST ONLY)(CPT=74177), 5/02/2023, 5:58 PM.  INDICATIONS:  abd pain  TECHNIQUE:  CT scanning was performed from the dome of the diaphragm to the pubic symphysis with non-ionic intravenous contrast material. Post contrast coronal MPR imaging was performed.  Dose reduction techniques were used. Dose information is transmitted to the ACR (American College of Radiology) NRDR (National Radiology Data Registry) which includes the Dose Index Registry.  PATIENT STATED HISTORY:(As transcribed by Technologist)  Upper abdominal pain, history of previous small bowel obstruction.   CONTRAST USED:  100cc of Isovue 370  FINDINGS:  LIVER:  No enlargement, atrophy, abnormal density, or significant focal  lesion.  BILIARY:  Status post cholecystectomy.  Stable prominence of the biliary system. PANCREAS:  No ductal dilation or mass.  The distal body and tail are herniated into the chest, unchanged from prior. SPLEEN:  No enlargement or focal lesion.  KIDNEYS:  No mass, obstruction, or calcification.  ADRENALS:  No mass or enlargement.  AORTA/VASCULAR:  No aneurysm or dissection.  RETROPERITONEUM:  No mass or adenopathy.  BOWEL/MESENTERY:  Small hiatal hernia.  There is dilated small bowel in the right abdomen measuring up to 3.6 centimeters.  There is a transition point within the right abdomen (series 2, image 51, series 4 image 35 with collapsed distal small bowel.  Normal appendix.  There is mild interloop ascites along the dilated small bowel. ABDOMINAL WALL:  No mass or hernia.  URINARY BLADDER:  No visible focal wall thickening, lesion, or calculus.  PELVIC NODES:  No adenopathy.  PELVIC ORGANS:  No visible mass.  Pelvic organs appropriate for patient age.  There is a small amount of pelvic free fluid, which may be physiologic. BONES:  No bony lesion or fracture.  LUNG BASES:  There is a left diaphragmatic hernia containing portion of the pancreas.  Emphysema of the left lung is redemonstrated. OTHER:  Negative.             CONCLUSION:  1. Small bowel obstruction with transition point in the right mid abdomen.  Mild interloop ascites is present. 2. 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   LOCATION:  [facility]   Dictated by (CST): Abdirahman Saavedra MD on 2024 at 10:22 PM     Finalized by (CST): Abdirahman Saavedra MD on 2024 at 10:31 PM          Problem List:    Patient Active Problem List   Diagnosis    Asthma (HCC)    H/O hydrocephalus s/p  shunt as     Emphysema lung (HCC)    Epigastric pain    Diarrhea    Dextrocardia (HCC)    Chronic migraine without aura without status migrainosus, not intractable    Chronic nonintractable  headache, unspecified headache type    Congenital absence of lung    Choledocholithiasis    Elevated liver enzymes    Calculus of gallbladder without cholecystitis without obstruction    Gallstones    Hx SBO    B12 deficiency    Vitamin D deficiency    PTSD (post-traumatic stress disorder)    SCARLETT (generalized anxiety disorder)    Moderate episode of recurrent major depressive disorder (HCC)    Hyponatremia    Abdominal pain, acute    SBO (small bowel obstruction) (HCC)    Hyperglycemia    Small bowel obstruction (HCC)    Gastroesophageal reflux disease with esophagitis without hemorrhage       Impression:    35 y/o with SBO  CT scan as noted above  Afebrile no leukocytosis  Appears non toxic, soft, mild distension with tenderness in epigastrium extending to right abdomen    Plan:    Reviewed with patient recommendations for conservative management   -NPO, ice chips ok  -NG to LIS  -serial abdominal exams  -IV fluids  -encourage ambulation  -obstructive series  Explained to patient that if their condition deteriorates, they may require surgical management.       TANK Smith  [facility]  General Surgery  4/2/2024

## 2024-04-02 NOTE — PROGRESS NOTES
[facility]   part of [facility]     Hospitalist Progress Note     Yunior Parks Patient Status:  Inpatient    1990 MRN [id number]   Location [facility] 3NW-A Attending Reji Napoles MD   Hosp Day # 0 PCP KHAI FRANCO     Subjective:   Still with pain, no flatus  No fever. No vomiting  Little SOB with wheezing     Objective:    Review of Systems:   A comprehensive review of systems was completed; pertinent positive and negatives stated in subjective.  Vital signs:  Temp:  [98.1 °F (36.7 °C)-98.5 °F (36.9 °C)] 98.2 °F (36.8 °C)  Pulse:  [67-89] 89  Resp:  [16-18] 18  BP: (125-151)/() 140/94  SpO2:  [94 %-100 %] 94 %  Physical Exam:    General: No acute distress NGT to LIS    Respiratory: + wheezes, no rhonchi  Cardiovascular: S1, S2, RRR  Abdomen: Soft, Tender diffusely, absent BS  Extremities: no edema    Diagnostic Data:    Labs:  Recent Labs   Lab 24  2102 24  0515   WBC 13.4* 9.9   HGB 13.6 13.1   MCV 90.9 91.6   .0 261.0     Recent Labs   Lab 24  2102 24  0515   * 118*   BUN 11 8*   CREATSERUM 0.81 0.68   CA 9.0 8.5   ALB 4.0  --    * 140   K 3.8 4.3    108   CO2 25.0 28.0   ALKPHO 80  --    AST 19  --    ALT 27  --    BILT 0.4  --    TP 7.5  --      Estimated Creatinine Clearance: 100.7 mL/min (based on SCr of 0.68 mg/dL).  No results for input(s): \"PTP\", \"INR\" in the last 168 hours.     Microbiology  No results found for this visit on 24.  Imaging: Reviewed in Epic.  Medications:    heparin  5,000 Units Subcutaneous Q8H IGGY       Assessment & Plan:    #SBO with h/o multiple abd surgeries   -bowel rest, IVF, IV Anlagesics  -Sx consulted  -NGT to LIS     #Congenital anomaly born with 1 lung and has asthma/emphysema- h/o trach  #Hypoxia- suspect component of above and IV narcotics  -wean O2 as tolerated   -add on nebs and consider steroids with wheezing but will hold for today    #Dextrocardia  #h/o hydrocephalus sp  shunt at  birth     #Depression- SSRI once on oral diet     #GERD- PPI        Reji Napoles MD  Supplementary Documentation:   Quality:  DVT Mechanical Prophylaxis:   SCDs,    DVT Pharmacologic Prophylaxis   Medication    heparin (Porcine) 5000 UNIT/ML injection 5,000 Units                Code Status: Prior  Knight: No urinary catheter in place  Knight Duration (in days):   Central line:    LITO:   At this point Ms. Parks is expected to be discharge to: home     The 21st Century Cures Act makes medical notes like these available to patients in the interest of transparency. Please be advised this is a medical document. Medical documents are intended to carry relevant information, facts as evident, and the clinical opinion of the practitioner. The medical note is intended as peer to peer communication and may appear blunt or direct. It is written in medical language and may contain abbreviations or verbiage that are unfamiliar.

## 2024-04-02 NOTE — ED QUICK NOTES
Orders for admission, patient is aware of plan and ready to go upstairs. Any questions, please call ED RN Serina at extension [phone].     Patient Covid vaccination status: Fully vaccinated     COVID Test Ordered in ED: None    COVID Suspicion at Admission: N/A    Running Infusions:      Mental Status/LOC at time of transport: A&Ox4    Other pertinent information:   CIWA score: N/A   NIH score:  N/A

## 2024-04-02 NOTE — H&P
[facility]     Yunior Parks Patient Status:  Emergency    1990 MRN [id number]   Location [facility] EMERGENCY DEPARTMENT Attending Dany Gross MD   Hosp Day # 0 PCP KHAI FRANCO     Chief Complaint: Abdominal pain    Subjective:    History of Present Illness:     Yunior Parks is a 34 year old female with history of asthma, migraines, depression, GERD, hydrocephalus status post  shunt presents to the emergency room with abdominal pain that started about noon.  She describes the pain as crampy and tender throughout the abdomen more so in the upper quadrants.  Patient states that the pain is constant and is 6 and sometimes intensifies to a 10.  Patient does have some nausea but denies any vomiting.  Patient did state that she had a bowel movement earlier this morning that was normal.  Patient has had a history of a few bowel obstructions in the past.  Patient has had her gallbladder taken out.  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  Patient denies any fevers, chills, chest pain, shortness of breath, palpitations.    History/Other:    Past Medical History:  Past Medical History:   Diagnosis Date    Asthma (HCC) 2014    Chronic migraine without aura without status migrainosus, not intractable 2015    Chronic nonintractable headache, unspecified headache type 2015    Congenital absence of lobe of lung 2014    left side     Diarrhea 2015    Emphysema lung (HCC) 2015    realted to lung stretch due to lack of full development of the left lung    Epigastric pain 2015    H/O hydrocephalus s/p  shunt as  2014    Hx SBO 2018    MDD (major depressive disorder), recurrent episode, moderate (HCC) 2022    Migraines      Past Surgical History:   Past Surgical History:   Procedure Laterality Date    ERCP - REFERRAL      PD stent placed; CBD sludge  removed    OTHER AMBL SURG  birth    trach    OTHER SURGICAL HISTORY  birth    ex lap, repositioning of organs, shunts    OTHER SURGICAL HISTORY      left artificial diaphragm    PARAGARD, IUD  04/16/2015    REMOVAL OF LUNG,LOBECTOMY        Family History:   Family History   Problem Relation Age of Onset    Lipids Mother     Hypertension Mother     Heart Disorder Mother     Diabetes Mother      Social History:    reports that she has never smoked. She has never used smokeless tobacco. She reports that she does not drink alcohol and does not use drugs.     Allergies:   Allergies   Allergen Reactions    Pcn [Penicillins] SWELLING    Lovenox [Enoxaparin] RASH       Medications:    No current facility-administered medications on file prior to encounter.     Current Outpatient Medications on File Prior to Encounter   Medication Sig Dispense Refill    sertraline 100 MG Oral Tab Take 100 mg by mouth daily. Patient takes two tablets once a day      clonazePAM 0.5 MG Oral Tab Take 0.5 mg by mouth 3 (three) times daily.      famoTIDine 20 MG Oral Tab Take 1 tablet (20 mg total) by mouth nightly as needed for Heartburn. 30 tablet 0    ondansetron (ZOFRAN ODT) 4 MG Oral Tablet Dispersible Take 1 tablet (4 mg total) by mouth every 8 (eight) hours as needed for Nausea. 10 tablet 0    omeprazole 20 MG Oral Capsule Delayed Release Take 1 capsule (20 mg total) by mouth once daily. 30 capsule 2    PARAGARD INTRAUTERINE COPPER IU by Intrauterine route.      albuterol sulfate (2.5 MG/3ML) 0.083% Inhalation Nebu Soln Inhale 2.5 mg into the lungs.      Albuterol Sulfate 108 (90 Base) MCG/ACT Inhalation Aerosol Powder, Breath Activated Inhale 2 puffs into the lungs every 6 (six) hours as needed. For wheezing or shortness of breath      Mometasone Furo-Formoterol Fum (DULERA) 200-5 MCG/ACT Inhalation Aerosol Inhale 2 puffs into the lungs 2 (two) times daily. 1 Inhaler 5       Review of Systems:   A comprehensive review of systems was  completed.    Pertinent positives and negatives noted in the HPI.    Objective:   Physical Exam:    /82   Pulse 74   Temp 98.2 °F (36.8 °C)   Resp 16   Ht 5' 4\" (1.626 m)   Wt 170 lb (77.1 kg)   SpO2 96%   BMI 29.18 kg/m²   General: No acute distress, Alert  Respiratory: No rhonchi, no wheezes  Cardiovascular: S1, S2. Regular rate and rhythm  Abdomen: Soft, Non-tender, non-distended, positive bowel sounds  Neuro: No new focal deficits  Extremities: No edema      Results:    Labs:      Labs Last 24 Hours:    Recent Labs   Lab 04/01/24 2102   RBC 4.50   HGB 13.6   HCT 40.9   MCV 90.9   MCH 30.2   MCHC 33.3   RDW 11.6   NEPRELIM 9.71*   WBC 13.4*   .0       Recent Labs   Lab 04/01/24 2102   *   BUN 11   CREATSERUM 0.81   EGFRCR 98   CA 9.0   ALB 4.0   *   K 3.8      CO2 25.0   ALKPHO 80   AST 19   ALT 27   BILT 0.4   TP 7.5       No results found for: \"PT\", \"INR\"    No results for input(s): \"TROP\", \"TROPHS\", \"CK\" in the last 168 hours.    No results for input(s): \"TROP\", \"PBNP\" in the last 168 hours.    No results for input(s): \"PCT\" in the last 168 hours.    Imaging: Imaging data reviewed in Epic.    Assessment & Plan:      # Small bowel obstruction   -will continue IV fluids  -Keep n.p.o.  -Continue IV antiemetics  -General surgery on consult.    # Asthma  -As needed albuterol inhaler    # Depression  - m we will resume Zoloft once tolerating oral diet.    # GERD  -Will use IV PPI until tolerating oral diet.        Plan of care discussed with patient at bedside.    Trace Ba, DO    Supplementary Documentation:     The 21st Century Cures Act makes medical notes like these available to patients in the interest of transparency. Please be advised this is a medical document. Medical documents are intended to carry relevant information, facts as evident, and the clinical opinion of the practitioner. The medical note is intended as peer to peer communication and may  appear blunt or direct. It is written in medical language and may contain abbreviations or verbiage that are unfamiliar.

## 2024-04-02 NOTE — ED INITIAL ASSESSMENT (HPI)
Patient reports severe abd pain that started at noon today with nausea. Reports she has had 3 small bowel obstructions in the past and this pain feels similar to past SBOs

## 2024-04-03 NOTE — PROGRESS NOTES
[facility]   part of [facility]     Hospitalist Progress Note     Yunior BERTHA QuinonesKasey Patient Status:  Inpatient    1990 MRN [id number]   Location [facility] 3NW-A Attending Reji Napoles MD   Hosp Day # 1 PCP KHAI FRANCO     Subjective:   Patient seen and examined. Denies abdominal pain. + Flatus, denies BM.     Objective:    Review of Systems:   A comprehensive review of systems was completed; pertinent positive and negatives stated in subjective.  Vital signs:  Temp:  [97.8 °F (36.6 °C)-98.9 °F (37.2 °C)] 98.8 °F (37.1 °C)  Pulse:  [82-93] 92  Resp:  [17-18] 18  BP: (118-140)/(77-85) 128/84  SpO2:  [94 %-100 %] 99 %  Physical Exam:    General: No acute distress NGT to LIS    Respiratory: + wheezes, no rhonchi  Cardiovascular: S1, S2, RRR  Abdomen: Soft, NT/ND.  Extremities: no edema    Diagnostic Data:    Labs:  Recent Labs   Lab 24  2102 24  0515   WBC 13.4* 9.9   HGB 13.6 13.1   MCV 90.9 91.6   .0 261.0     Recent Labs   Lab 242 24  0515   * 118*   BUN 11 8*   CREATSERUM 0.81 0.68   CA 9.0 8.5   ALB 4.0  --    * 140   K 3.8 4.3    108   CO2 25.0 28.0   ALKPHO 80  --    AST 19  --    ALT 27  --    BILT 0.4  --    TP 7.5  --      Estimated Creatinine Clearance: 100.7 mL/min (based on SCr of 0.68 mg/dL).  No results for input(s): \"PTP\", \"INR\" in the last 168 hours.     Microbiology  No results found for this visit on 24.  Imaging: Reviewed in Epic.  Medications:    heparin  5,000 Units Subcutaneous Q8H IGGY       Assessment & Plan:    #SBO with h/o multiple abd surgeries   -Clinically improving  -SBFT negative for SBO  -NGT removed, diet per surgery    #Congenital anomaly born with 1 lung and has asthma/emphysema- h/o trach    #Hypoxia  -Resolved     #Dextrocardia    #h/o hydrocephalus sp  shunt at birth     #Depression- SSRI once on oral diet     #GERD- PPI    Rj Coelho DO    Supplementary Documentation:   Quality:  DVT  Mechanical Prophylaxis:   SCDs,    DVT Pharmacologic Prophylaxis   Medication    heparin (Porcine) 5000 UNIT/ML injection 5,000 Units                Code Status: Prior  Knight: No urinary catheter in place  Knight Duration (in days):   Central line:    LITO:   At this point Ms. Parks is expected to be discharge to: home     The 21st Century Cures Act makes medical notes like these available to patients in the interest of transparency. Please be advised this is a medical document. Medical documents are intended to carry relevant information, facts as evident, and the clinical opinion of the practitioner. The medical note is intended as peer to peer communication and may appear blunt or direct. It is written in medical language and may contain abbreviations or verbiage that are unfamiliar.

## 2024-04-03 NOTE — PLAN OF CARE
A&Ox4. VSS. 1L o2 NC. . Denies chest pain and SOB.   Telemetry: NSR.   GI: Abdomen soft, rounded, nondistended. Denies passing gas.   Denies nausea after compazine administration  : Voids.   Pain controlled with PRN pain medications.   Up independently after set up  Drains: NGT to lis   Diet: NPO with ice  IVF running per order.   All appropriate safety measures in place. All questions and concerns addressed

## 2024-04-03 NOTE — PROGRESS NOTES
[facility]  Progress Note    Yunior Parks Patient Status:  Inpatient    1990 MRN [id number]   Location [facility] 3NW-A Attending jR Coelho DO   Hosp Day # 1 PCP KHAI FRANCO     Subjective:    Patient reports she is passing flatus, she is feeling somewhat improved  NG output charted 450cc/24hr    Objective/Physical Exam:    Vital Signs:  Blood pressure 135/85, pulse 93, temperature 98.7 °F (37.1 °C), temperature source Oral, resp. rate 18, height 5' 4\" (1.626 m), weight 175 lb 9.6 oz (79.7 kg), SpO2 95%, not currently breastfeeding.    General:  Alert, orientated x3.  Cooperative.  No apparent distress.    Lungs:  Non labored breathing    Cardiac:  Regular rate and rhythm.     Abdomen:  soft, improved distension, tenderness improved    Extremities:  No lower extremity edema noted.  Without clubbing or cyanosis.      Problem List:  Patient Active Problem List   Diagnosis    Asthma (HCC)    H/O hydrocephalus s/p  shunt as     Emphysema lung (HCC)    Epigastric pain    Diarrhea    Dextrocardia (HCC)    Chronic migraine without aura without status migrainosus, not intractable    Chronic nonintractable headache, unspecified headache type    Congenital absence of lung    Choledocholithiasis    Elevated liver enzymes    Calculus of gallbladder without cholecystitis without obstruction    Gallstones    Hx SBO    B12 deficiency    Vitamin D deficiency    PTSD (post-traumatic stress disorder)    SCARLETT (generalized anxiety disorder)    Moderate episode of recurrent major depressive disorder (HCC)    Hyponatremia    Abdominal pain, acute    SBO (small bowel obstruction) (HCC)    Hyperglycemia    Small bowel obstruction (HCC)    Gastroesophageal reflux disease with esophagitis without hemorrhage       Impression:     35 y/o with SBO  Passing flatus, improved pain and distension  NG output 450cc/24 hr    Plan:    Will obtain SBFT today  Consider clamping NG pending imaging studies  Encourage  ambulation/IS  All questions answered    ADDEND:  SBFT reviewed, contrast passed to colon in 40 mins, will remove NG tube and give clear liquids, NPO midnight  All questions answered    TANK Smith  [facility]  General Surgery  4/3/2024

## 2024-04-03 NOTE — PAYOR COMM NOTE
--------------  ADMISSION REVIEW     Payor: [employer]  Subscriber #:  [id number]  Authorization Number: [id number]    Admit date: 24  Admit time: 12:34 AM       REVIEW DOCUMENTATION:     ED Provider Notes        ED Provider Notes signed by Dany Gross MD at 2024 11:00 PM       Author: Dany Gross MD Service: -- Author Type: Physician    Filed: 2024 11:00 PM Date of Service: 2024  8:42 PM Status: Signed    : Dany Gross MD (Physician)           Patient Seen in: [facility] Emergency Department      History     Chief Complaint   Patient presents with    Abdomen/Flank Pain     Stated Complaint: abd pain    Subjective:   HPI    34-year-old female complaining of abdominal pain patient states this started a few hours ago about noon today she has crampy generalized somewhat more in the upper abdomen pain that fluctuates intensity to about a 6 out of 10 currently was worse earlier she has nausea but no vomiting she had a bowel movement earlier this morning.  She states that she has a history of 2 or 3 previous bowel obstructions this seems similar.  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  She had a tracheostomy and was in the NICU the first 2 years of her life.  She denies any fever or chills    Objective:   Past Medical History:   Diagnosis Date    Asthma (HCC) 2014    Chronic migraine without aura without status migrainosus, not intractable 2015    Chronic nonintractable headache, unspecified headache type 2015    Congenital absence of lobe of lung 2014    left side     Diarrhea 2015    Emphysema lung (HCC) 2015    realted to lung stretch due to lack of full development of the left lung    Epigastric pain 2015    H/O hydrocephalus s/p  shunt as  2014    Hx SBO  5/14/2018    MDD (major depressive disorder), recurrent episode, moderate (HCC) 1/13/2022    Migraines               Past Surgical History:   Procedure Laterality Date    ERCP - REFERRAL  4/17    PD stent placed; CBD sludge removed    OTHER AMBL SURG  birth    trach    OTHER SURGICAL HISTORY  birth    ex lap, repositioning of organs, shunts    OTHER SURGICAL HISTORY      left artificial diaphragm    PARAGARD, IUD  04/16/2015    REMOVAL OF LUNG,LOBECTOMY                  Social History     Socioeconomic History    Marital status:    Tobacco Use    Smoking status: Never    Smokeless tobacco: Never   Vaping Use    Vaping Use: Never used   Substance and Sexual Activity    Alcohol use: No     Comment: rare    Drug use: No    Sexual activity: Yes     Partners: Male     Birth control/protection: Condom, I.U.D.              Review of Systems    Positive for stated complaint: abd pain  Other systems are as noted in HPI.  Constitutional and vital signs reviewed.      All other systems reviewed and negative except as noted above.    Physical Exam     ED Triage Vitals [04/01/24 1936]   BP (!) 142/104   Pulse 82   Resp 16   Temp 98.2 °F (36.8 °C)   Temp src    SpO2 96 %   O2 Device None (Room air)       Current:/85   Pulse 78   Temp 98.2 °F (36.8 °C)   Resp 16   Ht 162.6 cm (5' 4\")   Wt 77.1 kg   SpO2 97%   BMI 29.18 kg/m²         Physical Exam    Patient is alert orient x 3 no acute distress HEENT exam within normal limits neck there is no lymphadenopathy or JVD lungs are clear cardiovascular exam shows regular rate and rhythm without murmurs abdomen is soft bowel sounds are active there is mild to moderate distention there is moderate tenderness throughout more so in the upper abdomen in the right lower quadrant no rebound or guarding.    ED Course     Labs Reviewed   COMP METABOLIC PANEL (14) - Abnormal; Notable for the following components:       Result Value    Glucose 132 (*)     Sodium 134 (*)     All  other components within normal limits   URINALYSIS WITH CULTURE REFLEX - Abnormal; Notable for the following components:    Protein Urine 20 (*)     All other components within normal limits   CBC W/ DIFFERENTIAL - Abnormal; Notable for the following components:    WBC 13.4 (*)     Neutrophil Absolute Prelim 9.71 (*)     Neutrophil Absolute 9.71 (*)     Monocyte Absolute 1.19 (*)     All other components within normal limits   LIPASE - Normal   POCT PREGNANCY URINE - Normal   CBC WITH DIFFERENTIAL WITH PLATELET    Narrative:     The following orders were created for panel order CBC With Differential With Platelet.  Procedure                               Abnormality         Status                     ---------                               -----------         ------                     CBC W/ DIFFERENTIAL[[id number]]          Abnormal            Final result                 Please view results for these tests on the individual orders.             CT ABDOMEN+PELVIS(CONTRAST ONLY)(CPT=74177)    Result Date: 4/1/2024  CONCLUSION:  1. Small bowel obstruction with transition point in the right mid abdomen.  Mild interloop ascites is present. 2. 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   LOCATION:  [facility]   Dictated by (CST): Abdirahman Saavedra MD on 4/01/2024 at 10:22 PM     Finalized by (CST): Abdirahman Saavedra MD on 4/01/2024 at 10:31 PM      Images independent review show small bowel obstruction      Initial differential diagnoses considered but not limited to includes  MDM      Small bowel obstruction gastroenteritis constipation appendicitis peptic ulcer disease pancreatitis    Case was discussed with the hospitalist and general surgery Dr. Mac recommended NG tube placement.  Patient will be admitted for bowel rest IV fluids pain medication.                               Medical Decision Making      Disposition and Plan     Clinical  Impression:  1. Small bowel obstruction (HCC)         Disposition:  There is no disposition on file for this visit.  There is no disposition time on file for this visit.    Follow-up:  No follow-up provider specified.        Medications Prescribed:  Current Discharge Medication List                                           Signed by Dany Gross MD on 4/1/2024 11:00 PM         MEDICATIONS ADMINISTERED IN LAST 1 DAY:  heparin (Porcine) 5000 UNIT/ML injection 5,000 Units       Date Action Dose Route User    4/3/2024 0512 Given 5,000 Units Subcutaneous (Left Lower Abdomen) Ashtyn Cheek RN    4/2/2024 2127 Given 5,000 Units Subcutaneous (Left Upper Abdomen) Ashtyn Cheek RN    4/2/2024 1422 Given 5,000 Units Subcutaneous (Left Lower Abdomen) Leilani Fraire RN          HYDROmorphone (Dilaudid) 1 MG/ML injection 0.4 mg       Date Action Dose Route User    4/2/2024 1238 Given 0.4 mg Intravenous Marsha Cuevas RN          ketorolac (Toradol) 30 MG/ML injection 30 mg       Date Action Dose Route User    4/3/2024 0807 Given 30 mg Intravenous Leilani Fraire RN    4/2/2024 2126 Given 30 mg Intravenous Ashtyn Cheek RN    4/2/2024 1458 Given 30 mg Intravenous Leilani Fraire RN          ondansetron (Zofran) 4 MG/2ML injection 4 mg       Date Action Dose Route User    4/2/2024 1422 Given 4 mg Intravenous Leilani Fraire RN          prochlorperazine (Compazine) 10 MG/2ML injection 5 mg       Date Action Dose Route User    4/2/2024 1926 Given 5 mg Intravenous Leilani Fraire RN          sodium chloride 0.9% infusion       Date Action Dose Route User    4/2/2024 1118 New Bag (none) Intravenous Leilani Fraire RN            Vitals (last day)       Date/Time Temp Pulse Resp BP SpO2 Weight O2 Device O2 Flow Rate (L/min) [facility]    04/03/24 0816 98.7 °F (37.1 °C) 93 18 135/85 95 % -- Nasal cannula 2 L/min     04/03/24 0620 98.9 °F (37.2 °C) 82 18 140/84 100 % -- None (Room air) -- MARTHA    04/03/24 0136 98 °F (36.7 °C) 83  18 129/83 94 % -- Nasal cannula 1 L/min MARTHA    04/02/24 1743 -- 86 -- -- 96 % -- -- -- AF    04/02/24 1615 97.8 °F (36.6 °C) 84 17 118/77 98 % -- Nasal cannula 2 L/min AF    04/02/24 1213 97.7 °F (36.5 °C) 86 18 124/69 97 % -- Nasal cannula 2 L/min AF    04/02/24 1033 -- -- -- -- -- -- Nasal cannula 2 L/min EM    04/02/24 0806 98.2 °F (36.8 °C) 89 18 140/94 94 % -- None (Room air) -- JK    04/02/24 0343 98.5 °F (36.9 °C) 67 18 151/100 97 % -- None (Room air) -- MQ    04/02/24 0049 98.1 °F (36.7 °C) 77 18 149/96 100 % 175 lb 9.6 oz None (Room air) -- MQ    04/02/24 0030 -- 82 -- 146/95 97 % -- None (Room air) -- BG         4/1 H&P  History of Present Illness:      Yunior Parks is a 34 year old female with history of asthma, migraines, depression, GERD, hydrocephalus status post  shunt presents to the emergency room with abdominal pain that started about noon.  She describes the pain as crampy and tender throughout the abdomen more so in the upper quadrants.  Patient states that the pain is constant and is 6 and sometimes intensifies to a 10.  Patient does have some nausea but denies any vomiting.  Patient did state that she had a bowel movement earlier this morning that was normal.  Patient has had a history of a few bowel obstructions in the past.  Patient has had her gallbladder taken out.  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              Assessment & Plan:   # Small bowel obstruction   -will continue IV fluids  -Keep n.p.o.  -Continue IV antiemetics  -General surgery on consult.     # Asthma  -As needed albuterol inhaler     # Depression  - m we will resume Zoloft once tolerating oral diet.     # GERD  -Will use IV PPI until tolerating oral diet.      4/2 HOSPTIALIST NOTE          Assessment & Plan:   # Small bowel obstruction   -will continue IV fluids  -Keep n.p.o.  -Continue IV antiemetics  -General surgery on consult.     #  Asthma  -As needed albuterol inhaler     # Depression  - m we will resume Zoloft once tolerating oral diet.     # GERD  -Will use IV PPI until tolerating oral diet.        Recent Labs   Lab 04/01/24 2102 04/02/24  0515   WBC 13.4* 9.9   HGB 13.6 13.1   MCV 90.9 91.6   .0 261.0           Recent Labs   Lab 04/01/24 2102 04/02/24  0515   * 118*   BUN 11 8*   CREATSERUM 0.81 0.68   CA 9.0 8.5   ALB 4.0  --    * 140   K 3.8 4.3    108   CO2 25.0 28.0   ALKPHO 80  --    AST 19  --    ALT 27  --    BILT 0.4  --    TP 7.5  --        Assessment & Plan:   #SBO with h/o multiple abd surgeries   -bowel rest, IVF, IV Anlagesics  -Sx consulted  -NGT to LIS      #Congenital anomaly born with 1 lung and has asthma/emphysema- h/o trach  #Hypoxia- suspect component of above and IV narcotics  -wean O2 as tolerated   -add on nebs and consider steroids with wheezing but will hold for today    #Dextrocardia  #h/o hydrocephalus sp  shunt at birth      #Depression- SSRI once on oral diet      #GERD- PPI           Reji Napoles MD    4/2 GEN SURGERY CONSULT NOTE  Reason for Consultation:     Surgical Consultation      CC:       Chief Complaint   Patient presents with    Abdomen/Flank Pain         History of Present Illness:     Yunior Parks is a a(n) 34 year old female. Patient reports abdominal pain that began yesterday, noted constant pressure/pain in her upper abdomen and radiating to the right, nausea and emesis associated, no flatus.   She has had 4 previous SBO last one was approx 1 year ago  CT scan obtained revealing SBO with transition point.   Ng tube was placed, mild improvement in pain this morning, no flatus   As an infant she had an artifical diaphragm as well as a bowel resection, she was in NICU for first 2 years of her life, lap cholecystectomy 6 years ago      .     Laboratory Data:       Recent Labs   Lab 04/01/24 2102 04/02/24  0515   WBC 13.4* 9.9   HGB 13.6 13.1   MCV 90.9 91.6   PLT  273.0 261.0              Recent Labs   Lab 04/01/24  2102 04/02/24  0515   * 140   K 3.8 4.3    108   CO2 25.0 28.0   BUN 11 8*   CREATSERUM 0.81 0.68   * 118*   CA 9.0 8.5             Recent Labs   Lab 04/01/24  2102   ALT 27   AST 19   ALB 4.0          Impression:     33 y/o with SBO  CT scan as noted above  Afebrile no leukocytosis  Appears non toxic, soft, mild distension with tenderness in epigastrium extending to right abdomen     Plan:     Reviewed with patient recommendations for conservative management   -NPO, ice chips ok  -NG to LIS  -serial abdominal exams  -IV fluids  -encourage ambulation  -obstructive series  Explained to patient that if their condition deteriorates, they may require surgical management.     4/3 GEN SURGERY NOTE    Patient reports she is passing flatus, she is feeling somewhat improved  NG output charted 450cc/24hr     Objective/Physical Exam:     Vital Signs:  Blood pressure 135/85, pulse 93, temperature 98.7 °F (37.1 °C), temperature source Oral, resp. rate 18, height 5' 4\" (1.626 m), weight 175 lb 9.6 oz (79.7 kg), SpO2 95%, not currently breastfeeding.     General:  Alert, orientated x3.  Cooperative.  No apparent distress.     Lungs:  Non labored breathing     Cardiac:  Regular rate and rhythm.     Abdomen:  soft, improved distension, tenderness improved     Extremities:  No lower extremity edema noted.  Without clubbing or cyanosis.        Impression:      33 y/o with SBO  Passing flatus, improved pain and distension  NG output 450cc/24 hr     Plan:     Will obtain SBFT today  Consider clamping NG pending imaging studies  Encourage ambulation/IS  All questions answered

## 2024-04-03 NOTE — PLAN OF CARE
Problem: Diabetes/Glucose Control  Goal: Glucose maintained within prescribed range  Description: INTERVENTIONS:  - Monitor Blood Glucose as ordered  - Assess for signs and symptoms of hyperglycemia and hypoglycemia  - Administer ordered medications to maintain glucose within target range  - Assess barriers to adequate nutritional intake and initiate nutrition consult as needed  - Instruct patient on self management of diabetes  Outcome: Progressing     Problem: PAIN - ADULT  Goal: Verbalizes/displays adequate comfort level or patient's stated pain goal  Description: INTERVENTIONS:  - Encourage pt to monitor pain and request assistance  - Assess pain using appropriate pain scale  - Administer analgesics based on type and severity of pain and evaluate response  - Implement non-pharmacological measures as appropriate and evaluate response  - Consider cultural and social influences on pain and pain management  - Manage/alleviate anxiety  - Utilize distraction and/or relaxation techniques  - Monitor for opioid side effects  - Notify MD/LIP if interventions unsuccessful or patient reports new pain  - Anticipate increased pain with activity and pre-medicate as appropriate  Outcome: Progressing     Problem: RISK FOR INFECTION - ADULT  Goal: Absence of fever/infection during anticipated neutropenic period  Description: INTERVENTIONS  - Monitor WBC  - Administer growth factors as ordered  - Implement neutropenic guidelines  Outcome: Progressing     Problem: SAFETY ADULT - FALL  Goal: Free from fall injury  Description: INTERVENTIONS:  - Assess pt frequently for physical needs  - Identify cognitive and physical deficits and behaviors that affect risk of falls.  - [place] fall precautions as indicated by assessment.  - Educate pt/family on patient safety including physical limitations  - Instruct pt to call for assistance with activity based on assessment  - Modify environment to reduce risk of injury  - Provide assistive  devices as appropriate  - Consider OT/PT consult to assist with strengthening/mobility  - Encourage toileting schedule  Outcome: Progressing     Problem: DISCHARGE PLANNING  Goal: Discharge to home or other facility with appropriate resources  Description: INTERVENTIONS:  - Identify barriers to discharge w/pt and caregiver  - Include patient/family/discharge partner in discharge planning  - Arrange for needed discharge resources and transportation as appropriate  - Identify discharge learning needs (meds, wound care, etc)  - Arrange for interpreters to assist at discharge as needed  - Consider post-discharge preferences of patient/family/discharge partner  - Complete POLST form as appropriate  - Assess patient's ability to be responsible for managing their own health  - Refer to Case Management Department for coordinating discharge planning if the patient needs post-hospital services based on physician/LIP order or complex needs related to functional status, cognitive ability or social support system  Outcome: Progressing   Alert and orient x 4 ,on room air , vitals stable , on tele with SR/ SB  , diminish bs , exp wheezing . Npo , n/g tube to LIS , draining brown in color , voids well , tolerated pain with pain medicine , up in room , plan of care discussed , answered all questions . Verbalized understanding , will continue to monitor

## 2024-04-04 NOTE — DISCHARGE SUMMARY
[facility] HOSPITALIST  DISCHARGE SUMMARY     Yunior Parks Patient Status:  Inpatient    1990 MRN [id number]   Location [facility] 3NW-A Attending Rj Coelho,     Day # 2 PCP KHAI FRANCO     Date of Admission: 2024  Date of Discharge:   2024    Discharge Disposition: Home or Self Care    Discharge Diagnosis:  SBO  Congential solitary lung  Dextrocardia   H/o hydrocephalous s/p VPS  Depression  GERD    History of Present Illness: Yunior Parks is a 34 year old female with history of asthma, migraines, depression, GERD, hydrocephalus status post  shunt presents to the emergency room with abdominal pain that started about noon.  She describes the pain as crampy and tender throughout the abdomen more so in the upper quadrants.  Patient states that the pain is constant and is 6 and sometimes intensifies to a 10.  Patient does have some nausea but denies any vomiting.  Patient did state that she had a bowel movement earlier this morning that was normal.  Patient has had a history of a few bowel obstructions in the past.  Patient has had her gallbladder taken out.  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  Patient denies any fevers, chills, chest pain, shortness of breath, palpitations.     Brief Synopsis:     #SBO with h/o multiple abd surgeries   -Clinically improving  -SBFT negative for SBO  -NGT removed, tolerating advancing diet with return of bowel function      #Congenital anomaly born with 1 lung and has asthma/emphysema- h/o trach     #Dextrocardia     #h/o hydrocephalus sp  shunt at birth      #Depression  -SSRI     #GERD  -PPI    Lace+ Score: 48  59-90 High Risk  29-58 Medium Risk  0-28   Low Risk       TCM Follow-Up Recommendation:  LACE 29-58: Moderate Risk of readmission after discharge from the hospital.      Procedures during hospitalization:   None    Incidental or significant findings and recommendations  (brief descriptions):  None    Lab/Test results pending at Discharge:   None    Consultants:  General surgery    Discharge Medication List:     Discharge Medications        CONTINUE taking these medications        Instructions Prescription details   Albuterol Sulfate 108 (90 Base) MCG/ACT Aepb      Inhale 2 puffs into the lungs every 6 (six) hours as needed. For wheezing or shortness of breath   Refills: 0     albuterol (2.5 MG/3ML) 0.083% Nebu  Commonly known as: Ventolin      Inhale 3 mL (2.5 mg total) into the lungs.   Refills: 0     clonazePAM 0.5 MG Tabs  Commonly known as: KlonoPIN      Take 0.5 mg by mouth 3 (three) times daily.   Refills: 0     famotidine 20 MG Tabs  Commonly known as: Pepcid      Take 1 tablet (20 mg total) by mouth nightly as needed for Heartburn.   Quantity: 30 tablet  Refills: 0     lamoTRIgine 100 MG Tabs  Commonly known as: LaMICtal      Take 1 tablet (100 mg total) by mouth daily.   Refills: 0     Lexapro 20 MG Tabs  Generic drug: escitalopram      Take 1 tablet (20 mg total) by mouth daily.   Refills: 0     Mometasone Furo-Formoterol Fum 200-5 MCG/ACT Aero  Commonly known as: Dulera      Inhale 2 puffs into the lungs 2 (two) times daily.   Quantity: 1 Inhaler  Refills: 5     omeprazole 20 MG Cpdr  Commonly known as: PriLOSEC      Take 1 capsule (20 mg total) by mouth once daily.   Quantity: 30 capsule  Refills: 2     ondansetron 4 MG Tbdp  Commonly known as: Zofran ODT      Take 1 tablet (4 mg total) by mouth every 8 (eight) hours as needed for Nausea.   Quantity: 10 tablet  Refills: 0     PARAGARD INTRAUTERINE COPPER IU      by Intrauterine route.   Refills: 0     sertraline 100 MG Tabs  Commonly known as: Zoloft      Take 100 mg by mouth daily. Patient takes two tablets once a day   Refills: 0              [place]    Follow-up appointment:   Romie Mac  [address]  [facility] [address] [phone]          Appointments for Next 30 Days 4/4/2024  - 2024      None            Vital signs:  Temp:  [97.9 °F (36.6 °C)-98.5 °F (36.9 °C)] 98.5 °F (36.9 °C)  Pulse:  [77-98] 85  Resp:  [16-18] 16  BP: (132-151)/(85-97) 140/90  SpO2:  [94 %-99 %] 99 %    Physical Exam:    General: No acute distress   Lungs: clear to auscultation  Cardiovascular: S1, S2  Abdomen: Soft    -----------------------------------------------------------------------------------------------  PATIENT DISCHARGE INSTRUCTIONS: See electronic chart    Rj Coelho DO    Total time spent on discharge plannin minutes     The  [employer] Act makes medical notes like these available to patients in the interest of transparency. Please be advised this is a medical document. Medical documents are intended to carry relevant information, facts as evident, and the clinical opinion of the practitioner. The medical note is intended as peer to peer communication and may appear blunt or direct. It is written in medical language and may contain abbreviations or verbiage that are unfamiliar.

## 2024-04-04 NOTE — PROGRESS NOTES
A&Ox4. VSS. RA. .  Telemetry: NSR  GI: Abdomen soft, nondistended. Passing gas.  Denies nausea.  : Voids.  Pain controlled with PRN pain medications  Up ad kristina.  Diet:tolerating full liquids, advancing to soft diet  IVF running per order.  All appropriate safety measures in place. All questions and concerns addressed. Will continue to monitor

## 2024-04-04 NOTE — PLAN OF CARE
A&Ox4. VSS. RA. . Denies chest pain and SOB.   Telemetry: NSR.   GI: Abdomen soft, rounded, nondistended. passing gas.   Denies nausea  : Voids.   Pain controlled with PRN pain medications.   Up independently after set up   Diet: NPO @0000  IVF running per order.   All appropriate safety measures in place. All questions and concerns addressed

## 2024-04-04 NOTE — PROGRESS NOTES
NURSING DISCHARGE NOTE    Discharged Home via Ambulatory.  Accompanied by Family member.  Belongings Taken by patient/family.

## 2024-04-04 NOTE — PROGRESS NOTES
[facility]  Progress Note    Yunior Quinoneslor Patient Status:  Inpatient    1990 MRN [id number]   Location [facility] 3NW-A Attending Rj Coelho,    Hosp Day # 2 PCP KHAI FRANCO     Subjective:    Patient reports her pain is improved, she continues to have Bm and pass flatus  SBFT noted contrast reaching colon in 40 mins, NG tube was removed yesterday, she did tolerating clear liquids.     Objective/Physical Exam:    Vital Signs:  Blood pressure 140/90, pulse 85, temperature 98.5 °F (36.9 °C), temperature source Oral, resp. rate 16, height 5' 4\" (1.626 m), weight 175 lb 9.6 oz (79.7 kg), SpO2 99%, not currently breastfeeding.    General:  Alert, orientated x3.  Cooperative.  No apparent distress.    Lungs:  Non labored breathing    Cardiac:  Regular rate and rhythm.     Abdomen:  soft, non distended, mild tenderness at the site of her heparin injection though otherwise non tender     Extremities:  No lower extremity edema noted.  Without clubbing or cyanosis.           Xray:  Reviewed    Problem List:  Patient Active Problem List   Diagnosis    Asthma (HCC)    H/O hydrocephalus s/p  shunt as     Emphysema lung (HCC)    Epigastric pain    Diarrhea    Dextrocardia (HCC)    Chronic migraine without aura without status migrainosus, not intractable    Chronic nonintractable headache, unspecified headache type    Congenital absence of lung    Choledocholithiasis    Elevated liver enzymes    Calculus of gallbladder without cholecystitis without obstruction    Gallstones    Hx SBO    B12 deficiency    Vitamin D deficiency    PTSD (post-traumatic stress disorder)    SCARLETT (generalized anxiety disorder)    Moderate episode of recurrent major depressive disorder (HCC)    Hyponatremia    Abdominal pain, acute    SBO (small bowel obstruction) (HCC)    Hyperglycemia    Small bowel obstruction (HCC)    Gastroesophageal reflux disease with esophagitis without hemorrhage       Impression:     35 y/o with  SBO- resolved  SBFT as noted above  Passing flatus, tolerating liquids    Plan:    Full liquids and advance diet to low fiber diet  Reviewed with patient recs for low fiber at home  If tolerating diet ok for dc home today  All questions answered  DW DR Bubba Hayes, PA  [facility]  4/4/2024

## 2024-04-05 NOTE — PAYOR COMM NOTE
--------------  DISCHARGE REVIEW    Payor: [employer]  Subscriber #:  [id number]  Authorization Number: [id number]    Admit date: 24  Admit time:  12:34 AM  Discharge Date: 2024  3:30 PM     Admitting Physician: Trace Ba DO  Attending Physician:  No att. providers found  Primary Care Physician: Yudy Franco          Discharge Summary Notes        Discharge Summary signed by Rj Coelho DO at 2024 12:37 PM       Author: Rj Coelho DO Specialty: HOSPITALIST Author Type: Physician    Filed: 2024 12:37 PM Date of Service: 2024 12:35 PM Status: Signed    : Rj Coelho DO (Physician)           [facility]     Yunior Parks Patient Status:  Inpatient    1990 MRN [id number]   Location [facility] 3N-A Attending Rj Coelho DO   Hosp Day # 2 PCP YUDY FRANCO     Date of Admission: 2024  Date of Discharge:   2024    Discharge Disposition: Home or Self Care    Discharge Diagnosis:  SBO  Congential solitary lung  Dextrocardia   H/o hydrocephalous s/p VPS  Depression  GERD    History of Present Illness: Yunior Parks is a 34 year old female with history of asthma, migraines, depression, GERD, hydrocephalus status post  shunt presents to the emergency room with abdominal pain that started about noon.  She describes the pain as crampy and tender throughout the abdomen more so in the upper quadrants.  Patient states that the pain is constant and is 6 and sometimes intensifies to a 10.  Patient does have some nausea but denies any vomiting.  Patient did state that she had a bowel movement earlier this morning that was normal.  Patient has had a history of a few bowel obstructions in the past.  Patient has had her gallbladder taken out.  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  Patient denies any fevers, chills, chest pain, shortness of breath,  palpitations.     Brief Synopsis:     #SBO with h/o multiple abd surgeries   -Clinically improving  -SBFT negative for SBO  -NGT removed, tolerating advancing diet with return of bowel function      #Congenital anomaly born with 1 lung and has asthma/emphysema- h/o trach     #Dextrocardia     #h/o hydrocephalus sp  shunt at birth      #Depression  -SSRI     #GERD  -PPI    Lace+ Score: 48  59-90 High Risk  29-58 Medium Risk  0-28   Low Risk       TCM Follow-Up Recommendation:  LACE 29-58: Moderate Risk of readmission after discharge from the hospital.      Procedures during hospitalization:   None    Incidental or significant findings and recommendations (brief descriptions):  None    Lab/Test results pending at Discharge:   None    Consultants:  General surgery    Discharge Medication List:     Discharge Medications        CONTINUE taking these medications        Instructions Prescription details   Albuterol Sulfate 108 (90 Base) MCG/ACT Aepb      Inhale 2 puffs into the lungs every 6 (six) hours as needed. For wheezing or shortness of breath   Refills: 0     albuterol (2.5 MG/3ML) 0.083% Nebu  Commonly known as: Ventolin      Inhale 3 mL (2.5 mg total) into the lungs.   Refills: 0     clonazePAM 0.5 MG Tabs  Commonly known as: KlonoPIN      Take 0.5 mg by mouth 3 (three) times daily.   Refills: 0     famotidine 20 MG Tabs  Commonly known as: Pepcid      Take 1 tablet (20 mg total) by mouth nightly as needed for Heartburn.   Quantity: 30 tablet  Refills: 0     lamoTRIgine 100 MG Tabs  Commonly known as: LaMICtal      Take 1 tablet (100 mg total) by mouth daily.   Refills: 0     Lexapro 20 MG Tabs  Generic drug: escitalopram      Take 1 tablet (20 mg total) by mouth daily.   Refills: 0     Mometasone Furo-Formoterol Fum 200-5 MCG/ACT Aero  Commonly known as: Dulera      Inhale 2 puffs into the lungs 2 (two) times daily.   Quantity: 1 Inhaler  Refills: 5     omeprazole 20 MG Cpdr  Commonly known as: PriLOSEC       Take 1 capsule (20 mg total) by mouth once daily.   Quantity: 30 capsule  Refills: 2     ondansetron 4 MG Tbdp  Commonly known as: Zofran ODT      Take 1 tablet (4 mg total) by mouth every 8 (eight) hours as needed for Nausea.   Quantity: 10 tablet  Refills: 0     PARAGARD INTRAUTERINE COPPER IU      by Intrauterine route.   Refills: 0     sertraline 100 MG Tabs  Commonly known as: Zoloft      Take 100 mg by mouth daily. Patient takes two tablets once a day   Refills: 0              [place]    Follow-up appointment:   Romie Mac  [address]  [address]  [facility] [id number]  [phone]          Appointments for Next 30 Days 2024 - 2024      None            Vital signs:  Temp:  [97.9 °F (36.6 °C)-98.5 °F (36.9 °C)] 98.5 °F (36.9 °C)  Pulse:  [77-98] 85  Resp:  [16-18] 16  BP: (132-151)/(85-97) 140/90  SpO2:  [94 %-99 %] 99 %    Physical Exam:    General: No acute distress   Lungs: clear to auscultation  Cardiovascular: S1, S2  Abdomen: Soft    -----------------------------------------------------------------------------------------------  PATIENT DISCHARGE INSTRUCTIONS: See electronic chart    Rj Coelho DO    Total time spent on discharge plannin minutes     The  [employer] Cures Act makes medical notes like these available to patients in the interest of transparency. Please be advised this is a medical document. Medical documents are intended to carry relevant information, facts as evident, and the clinical opinion of the practitioner. The medical note is intended as peer to peer communication and may appear blunt or direct. It is written in medical language and may contain abbreviations or verbiage that are unfamiliar.       Electronically signed by Rj Coelho DO on 2024 12:37 PM         REVIEWER COMMENTS

## (undated) DEVICE — SUTURE VICRYL 0

## (undated) DEVICE — Device

## (undated) DEVICE — DERMABOND LIQUID ADHESIVE

## (undated) DEVICE — LIGACLIP EXTRA LIGATING CLIP CARTRIDGES: 6 TITANIUM CLIPS/ CARTRIDGE (LARGE): Brand: LIGACLIP

## (undated) DEVICE — KENDALL SCD EXPRESS SLEEVES, KNEE LENGTH, MEDIUM: Brand: KENDALL SCD

## (undated) DEVICE — SOL  .9 1000ML BTL

## (undated) DEVICE — TROCAR BLADELESS 11MM B11LT

## (undated) DEVICE — BITE BLOCK ADULT 60F ELASTIC

## (undated) DEVICE — LAP CHOLE/APPY CDS-LF: Brand: MEDLINE INDUSTRIES, INC.

## (undated) DEVICE — SUTURE VICRYL 0 UR-6

## (undated) DEVICE — REM POLYHESIVE ADULT PATIENT RETURN ELECTRODE: Brand: VALLEYLAB

## (undated) DEVICE — LIGACLIP 10-M/L, 10MM ENDOSCOPIC ROTATING MULTIPLE CLIP APPLIERS: Brand: LIGACLIP

## (undated) DEVICE — RETRIEVAL BALLOON CATHETER: Brand: EXTRACTOR™ PRO XL

## (undated) DEVICE — PUSHING CATHETER: Brand: COOK

## (undated) DEVICE — UNDYED BRAIDED (POLYGLACTIN 910), SYNTHETIC ABSORBABLE SUTURE: Brand: COATED VICRYL

## (undated) DEVICE — 1200CC GUARDIAN II: Brand: GUARDIAN

## (undated) DEVICE — ENDO POUCH

## (undated) DEVICE — OPEN-END FLEXI-TIP URETERAL CATHETER: Brand: FLEXI-TIP

## (undated) DEVICE — GLOVE BIOGEL M SURG SZ 6-1/2

## (undated) DEVICE — ENDOPATH XCEL WITH OPTIVIEW TECHNOLOGY BLADELESS TROCARS WITH STABILITY SLEEVES: Brand: ENDOPATH XCEL OPTIVIEW

## (undated) DEVICE — ZIPWIRE GUIDEWIRE .038X150 STR

## (undated) DEVICE — ENDOPATH XCEL BLADELESS TROCARS WITH STABILITY SLEEVES: Brand: ENDOPATH XCEL

## (undated) DEVICE — ENDOPATH XCEL UNIVERSAL TROCAR STABLILITY SLEEVES: Brand: ENDOPATH XCEL

## (undated) DEVICE — JAGWIRE STRGHT TIP .025X450CM

## (undated) DEVICE — DRAPE HALF 40X58 DYNJP2410

## (undated) DEVICE — TRUETOME 39 3.9 FX 20CM

## (undated) NOTE — LETTER
[facility]  [address]ian [address] [facility], [address] [place] [address]    Consent for Operation    Date: __________________    Time: _______________    1.  I authorize the performance upon Yunior Mg the following operation:    Procedure(s):  ENDOSCOPIC ULTRAS revealed by the pictures or by descriptive texts accompanying them. If the procedure has been videotaped, the surgeon will obtain the original videotape. The hospital will not be responsible for storage or maintenance of this tape.     6. For the purpose of THAT MY DOCTOR PROVIDED ME WITH THE ABOVE EXPLANATIONS, THAT ALL BLANKS OR STATEMENTS REQUIRING INSERTION OR COMPLETION WERE FILLED IN.     Signature of Patient:   ___________________________    When the patient is a minor or mentally incompetent to give co · All of the medicines I take (including prescriptions, herbal supplements, and pills I can buy without a prescription (including street drugs/illegal medications).  Failure to inform my anesthesiologist about these medicines may increase my risk of anesthe _____________________________________________________________________________  Anesthesiologist Signature     Date   Time  I have discussed the procedure and information above with the patient (or patient’s representative) and answered their questions.  The

## (undated) NOTE — ED AVS SNAPSHOT
[facility]  [address]    [address] [place] [address]    Phone:  [phone]    Fax:  [phone]           Dalton Pete   MRN: [id number]    Department:  THE [facility]   Date of Visit nuestro adminstrador de hebert rich [phone]. Expect to receive an electronic request (by e-mail or text) to complete a self-assessment the day after your visit. You may also receive a call from our patient liason soon after your visit.  Also, some [facility] ([address]) [address] [facility] [address]  ([address]) [address] [facility]. ([address]) [address] [facility] [address] [place] [address] 30. (8 MyChart questions? Call [phone] for help. [employer] is NOT to be used for urgent needs. For medical emergencies, dial 911.

## (undated) NOTE — LETTER
[facility]  Lian [address] [facility], [address] [place] [address]    Consent for Operation    Date: __________________    Time: _______________    1.  I authorize the performance upon Yunior Mg the following operation:    Procedure(s):  ENDOSCOPIC ULTRAS procedure has been videotaped, the surgeon will obtain the original videotape. The hospital will not be responsible for storage or maintenance of this tape.     6. For the purpose of advancing medical education, I consent to the admittance of observers to t STATEMENTS REQUIRING INSERTION OR COMPLETION WERE FILLED IN.     Signature of Patient:   ___________________________    When the patient is a minor or mentally incompetent to give consent:  Signature of person authorized to consent for patient: ____________ supplements, and pills I can buy without a prescription (including street drugs/illegal medications). Failure to inform my anesthesiologist about these medicines may increase my risk of anesthetic complications.   · If I am allergic to anything or have had Anesthesiologist Signature     Date   Time  I have discussed the procedure and information above with the patient (or patient’s representative) and answered their questions. The patient or their representative has agreed to have anesthesia services.     ___

## (undated) NOTE — LETTER
[facility]  [address] [facility], [address] [place] [address]    Consent for Operation    Date: __________________    Time: _______________    1.  I authorize the performance upon Yunior Mg the following operation:    Procedure(s):  LAPAROSCOPIC CHOL procedure has been videotaped, the surgeon will obtain the original videotape. The hospital will not be responsible for storage or maintenance of this tape.     6. For the purpose of advancing medical education, I consent to the admittance of observers to t STATEMENTS REQUIRING INSERTION OR COMPLETION WERE FILLED IN.     Signature of Patient:   ___________________________    When the patient is a minor or mentally incompetent to give consent:  Signature of person authorized to consent for patient: ____________ supplements, and pills I can buy without a prescription (including street drugs/illegal medications). Failure to inform my anesthesiologist about these medicines may increase my risk of anesthetic complications.   · If I am allergic to anything or have had Anesthesiologist Signature     Date   Time  I have discussed the procedure and information above with the patient (or patient’s representative) and answered their questions. The patient or their representative has agreed to have anesthesia services.     ___

## (undated) NOTE — IP AVS SNAPSHOT
[facility] [place] [address] [place], [address] ~ [phone]                Discharge Summary   4[facility]17/2017    [facility]           Admission Information        Provider Department    4/17/2017 Brenda Ibrahim MD  3n albuterol sulfate (2.5 MG/3ML) 0.083% Nebu   Commonly known as:  VENTOLIN        Take 3 mL (2.5 mg total) by nebulization every 6 (six) hours as needed for Wheezing or Shortness of Breath.     Chance Vital • Avoid strenuous activity such as running and physical workouts for 3 weeks. Normal daily activities are fine, such as climbing stairs  • You may shower after 24 hours. The steristrips can get wet but should not be under water in a bath.   • You may return [phone]        Discharge Orders     Future Labs/Procedures Expected by Expires    XR ABDOMEN (KUB) (1 AP VIEW)  (CPT=74000)  5/4/2017 (Approximate) 4/20/2018    Scheduling Instructions:     To schedule an appointment for your radiology test please call Metabolic Lab Results  (Last result in the past 90 days)    HgbA1C Glucose BUN Creatinine Calcium Alkaline Phosph AST    -- (04/20/17)  93 (04/20/17)  5 (L) (04/20/17)  0.64 (04/20/17)  8.2 (L) (04/20/17)  98 (04/20/17)  50 (H)      Metabolic Lab Results Call [phone] for help. [facility] is NOT to be used for urgent needs.   For medical emergencies, dial 911.             _____________________________________________________________________________    Medication Side Effects - Medications to be taken at Mometasone Furo-Formoterol Fum (DULERA) 200-5 MCG/ACT Inhalation Aerosol       Use:  Treatment of asthma, COPD/emphysema, cough, allergies   Most common side effects: Headache, nasal irritation, palpitations, increased heart rate, increased blood pressure

## (undated) NOTE — LETTER
[facility]  Lian Odell 61 [address] [facility], [address] [place] [address]    Consent for Operation    Date: _4/19/17__________    Time: _______________    1.  I authorize the performance upon Yunior Mg the following operation:    Procedure(s):  ENDOSCOPIC ULTRAS revealed by the pictures or by descriptive texts accompanying them. If the procedure has been videotaped, the surgeon will obtain the original videotape. The hospital will not be responsible for storage or maintenance of this tape.     6. For the purpose of THAT MY DOCTOR PROVIDED ME WITH THE ABOVE EXPLANATIONS, THAT ALL BLANKS OR STATEMENTS REQUIRING INSERTION OR COMPLETION WERE FILLED IN.     Signature of Patient:   ___________________________    When the patient is a minor or mentally incompetent to give co · All of the medicines I take (including prescriptions, herbal supplements, and pills I can buy without a prescription (including street drugs/illegal medications).  Failure to inform my anesthesiologist about these medicines may increase my risk of anesthe _____________________________________________________________________________  Anesthesiologist Signature     Date   Time  I have discussed the procedure and information above with the patient (or patient’s representative) and answered their questions.  The

## (undated) NOTE — ED AVS SNAPSHOT
Lennox Kim   MRN: [id number]    Department:  [facility] Emergency Department   Date of Visit:  12/31/2017           Disclosure     Insurance plans vary and the physician(s) referred by the ER may not be covered by your plan.  Please contact tell this physician (or your personal doctor if your instructions are to return to your personal doctor) about any new or lasting problems. The primary care or specialist physician will see patients referred from the [facility] Emergency Department.  Arthor Bernheim

## (undated) NOTE — LETTER
Date & Time: 4/17/2017, 5:31 PM  Patient: Cisco Newton  Attending Provider: Miguel Quezada MD      This certifies that I, Pumadenzel Newton, a patient at an [facility] facility, am leaving the facility voluntarily and aga